# Patient Record
Sex: MALE | Race: WHITE
[De-identification: names, ages, dates, MRNs, and addresses within clinical notes are randomized per-mention and may not be internally consistent; named-entity substitution may affect disease eponyms.]

---

## 2017-03-19 ENCOUNTER — HOSPITAL ENCOUNTER (INPATIENT)
Dept: HOSPITAL 47 - EC | Age: 62
LOS: 2 days | Discharge: HOME | DRG: 191 | End: 2017-03-21
Payer: COMMERCIAL

## 2017-03-19 DIAGNOSIS — I25.10: ICD-10-CM

## 2017-03-19 DIAGNOSIS — E11.65: ICD-10-CM

## 2017-03-19 DIAGNOSIS — E78.00: ICD-10-CM

## 2017-03-19 DIAGNOSIS — Z79.899: ICD-10-CM

## 2017-03-19 DIAGNOSIS — F17.200: ICD-10-CM

## 2017-03-19 DIAGNOSIS — Z83.3: ICD-10-CM

## 2017-03-19 DIAGNOSIS — Z91.19: ICD-10-CM

## 2017-03-19 DIAGNOSIS — J20.9: ICD-10-CM

## 2017-03-19 DIAGNOSIS — Z79.4: ICD-10-CM

## 2017-03-19 DIAGNOSIS — E78.5: ICD-10-CM

## 2017-03-19 DIAGNOSIS — J44.0: Primary | ICD-10-CM

## 2017-03-19 DIAGNOSIS — E86.0: ICD-10-CM

## 2017-03-19 DIAGNOSIS — Z71.6: ICD-10-CM

## 2017-03-19 DIAGNOSIS — I48.2: ICD-10-CM

## 2017-03-19 DIAGNOSIS — Z79.82: ICD-10-CM

## 2017-03-19 DIAGNOSIS — N17.9: ICD-10-CM

## 2017-03-19 LAB
ALP SERPL-CCNC: 46 U/L (ref 38–126)
ALT SERPL-CCNC: 38 U/L (ref 21–72)
ANION GAP SERPL CALC-SCNC: 7 MMOL/L
APTT BLD: 22 SEC (ref 22–30)
AST SERPL-CCNC: 29 U/L (ref 17–59)
BASOPHILS # BLD AUTO: 0 K/UL (ref 0–0.2)
BASOPHILS NFR BLD AUTO: 1 %
BUN SERPL-SCNC: 31 MG/DL (ref 9–20)
CALCIUM SPEC-MCNC: 9 MG/DL (ref 8.4–10.2)
CH: 31.9
CHCM: 34.3
CHLORIDE SERPL-SCNC: 100 MMOL/L (ref 98–107)
CK SERPL-CCNC: 95 U/L (ref 55–170)
CO2 SERPL-SCNC: 29 MMOL/L (ref 22–30)
EOSINOPHIL # BLD AUTO: 0.1 K/UL (ref 0–0.7)
EOSINOPHIL NFR BLD AUTO: 1 %
ERYTHROCYTE [DISTWIDTH] IN BLOOD BY AUTOMATED COUNT: 4.32 M/UL (ref 4.3–5.9)
ERYTHROCYTE [DISTWIDTH] IN BLOOD: 13 % (ref 11.5–15.5)
GLUCOSE BLD-MCNC: 168 MG/DL (ref 75–99)
GLUCOSE SERPL-MCNC: 242 MG/DL (ref 74–99)
GLUCOSE UR QL: (no result)
HCT VFR BLD AUTO: 40.3 % (ref 39–53)
HDW: 2.44
HGB BLD-MCNC: 13.6 GM/DL (ref 13–17.5)
INR PPP: 1 (ref ?–1.1)
LUC NFR BLD AUTO: 4 %
LYMPHOCYTES # SPEC AUTO: 0.5 K/UL (ref 1–4.8)
LYMPHOCYTES NFR SPEC AUTO: 11 %
MAGNESIUM SPEC-SCNC: 1.8 MG/DL (ref 1.6–2.3)
MCH RBC QN AUTO: 31.4 PG (ref 25–35)
MCHC RBC AUTO-ENTMCNC: 33.7 G/DL (ref 31–37)
MCV RBC AUTO: 93.4 FL (ref 80–100)
MONOCYTES # BLD AUTO: 0.4 K/UL (ref 0–1)
MONOCYTES NFR BLD AUTO: 10 %
NEUTROPHILS # BLD AUTO: 3.2 K/UL (ref 1.3–7.7)
NEUTROPHILS NFR BLD AUTO: 73 %
NON-AFRICAN AMERICAN GFR(MDRD): 54
PARTICLE COUNT: 1680
PH UR: 5.5 [PH] (ref 5–8)
PHOSPHATE SERPL-MCNC: 3.8 MG/DL (ref 2.5–4.5)
POTASSIUM SERPL-SCNC: 4.6 MMOL/L (ref 3.5–5.1)
PROT SERPL-MCNC: 6.5 G/DL (ref 6.3–8.2)
PROT UR QL: (no result)
PT BLD: 10.4 SEC (ref 9–12)
SODIUM SERPL-SCNC: 136 MMOL/L (ref 137–145)
SP GR UR: 1.02 (ref 1–1.03)
SQUAMOUS UR QL AUTO: 1 /HPF (ref 0–4)
TROPONIN I SERPL-MCNC: <0.012 NG/ML (ref 0–0.03)
UA BILLING (MACRO VS. MICRO): (no result)
UROBILINOGEN UR QL STRIP: <2 MG/DL (ref ?–2)
WBC # BLD AUTO: 0.17 10*3/UL
WBC # BLD AUTO: 4.4 K/UL (ref 3.8–10.6)
WBC #/AREA URNS HPF: 2 /HPF (ref 0–5)
WBC (PEROX): 4.67

## 2017-03-19 PROCEDURE — 87205 SMEAR GRAM STAIN: CPT

## 2017-03-19 PROCEDURE — 85610 PROTHROMBIN TIME: CPT

## 2017-03-19 PROCEDURE — 82553 CREATINE MB FRACTION: CPT

## 2017-03-19 PROCEDURE — 83605 ASSAY OF LACTIC ACID: CPT

## 2017-03-19 PROCEDURE — 83036 HEMOGLOBIN GLYCOSYLATED A1C: CPT

## 2017-03-19 PROCEDURE — 94760 N-INVAS EAR/PLS OXIMETRY 1: CPT

## 2017-03-19 PROCEDURE — 87070 CULTURE OTHR SPECIMN AEROBIC: CPT

## 2017-03-19 PROCEDURE — 81001 URINALYSIS AUTO W/SCOPE: CPT

## 2017-03-19 PROCEDURE — 94640 AIRWAY INHALATION TREATMENT: CPT

## 2017-03-19 PROCEDURE — 96361 HYDRATE IV INFUSION ADD-ON: CPT

## 2017-03-19 PROCEDURE — 84484 ASSAY OF TROPONIN QUANT: CPT

## 2017-03-19 PROCEDURE — 96375 TX/PRO/DX INJ NEW DRUG ADDON: CPT

## 2017-03-19 PROCEDURE — 87086 URINE CULTURE/COLONY COUNT: CPT

## 2017-03-19 PROCEDURE — 93005 ELECTROCARDIOGRAM TRACING: CPT

## 2017-03-19 PROCEDURE — 71020: CPT

## 2017-03-19 PROCEDURE — 87502 INFLUENZA DNA AMP PROBE: CPT

## 2017-03-19 PROCEDURE — 87040 BLOOD CULTURE FOR BACTERIA: CPT

## 2017-03-19 PROCEDURE — 84100 ASSAY OF PHOSPHORUS: CPT

## 2017-03-19 PROCEDURE — 85025 COMPLETE CBC W/AUTO DIFF WBC: CPT

## 2017-03-19 PROCEDURE — 83735 ASSAY OF MAGNESIUM: CPT

## 2017-03-19 PROCEDURE — 82550 ASSAY OF CK (CPK): CPT

## 2017-03-19 PROCEDURE — 85730 THROMBOPLASTIN TIME PARTIAL: CPT

## 2017-03-19 PROCEDURE — 99285 EMERGENCY DEPT VISIT HI MDM: CPT

## 2017-03-19 PROCEDURE — 80053 COMPREHEN METABOLIC PANEL: CPT

## 2017-03-19 PROCEDURE — 96365 THER/PROPH/DIAG IV INF INIT: CPT

## 2017-03-19 PROCEDURE — 36415 COLL VENOUS BLD VENIPUNCTURE: CPT

## 2017-03-19 RX ADMIN — ACETAMINOPHEN SCH MLS/HR: 10 INJECTION, SOLUTION INTRAVENOUS at 23:47

## 2017-03-19 NOTE — XR
EXAMINATION TYPE: XR chest 2V

 

DATE OF EXAM: 3/19/2017 8:34 PM

 

COMPARISON: NONE

 

HISTORY: Weakness.

 

TECHNIQUE:  Frontal and lateral views of the chest are obtained.

 

FINDINGS:  There is no focal air space opacity, pleural effusion, or pneumothorax seen.  Underlying e
mphysematous change is not excluded on lateral view. The cardiac silhouette size is within normal rogers
its.   The osseous structures are intact.

 

IMPRESSION:  No acute cardiopulmonary process.

## 2017-03-19 NOTE — ED
General Adult HPI





- General


Chief complaint: Dizziness


Stated complaint: Dizzy


Time Seen by Provider: 03/19/17 19:33


Source: patient, family, RN notes reviewed, old records reviewed


Mode of arrival: wheelchair


Limitations: no limitations





- History of Present Illness


Initial comments: 





This is a 7-year-old male ER for evaluation of not feeling well.  Patient 

states that this time he is not feeling very good.  His whole body feels off.  

Patient has history of A. fib CAD diabetes and high cholesterol.  Patient is 

noted episodic chills, sweats, feels fatigued and weak.  Increased cough but no 

significant congestion or shortness of breath.  No bowel pain no nausea 

vomiting or diarrhea.  No known sick contacts or travel history.  No recent 

hospitalization





- Related Data


 Home Medications











 Medication  Instructions  Recorded  Confirmed


 


Aspirin 325 mg PO QAM 02/09/16 03/19/17


 


Fenofibrate Nanocrystallized 145 mg PO QAM 02/09/16 03/19/17





[Tricor]   


 


Insulin NPH Hum/Reg Insulin Hm 55 unit SQ AC-BID 02/09/16 03/19/17





[NovoLIN 70-30 100 UNIT/ML VIAL]   


 


Simvastatin [Zocor] 80 mg PO HS 02/09/16 03/19/17


 


Digoxin [Digitek] 125 mcg PO DAILY 03/19/17 03/19/17


 


Ergocalciferol [Vitamin D2] 50,000 unit PO SA 03/19/17 03/19/17











 Allergies











Allergy/AdvReac Type Severity Reaction Status Date / Time


 


No Known Allergies Allergy   Verified 03/19/17 20:21














Review of Systems


ROS Statement: 


Those systems with pertinent positive or pertinent negative responses have been 

documented in the HPI.





ROS Other: All systems not noted in ROS Statement are negative.





Past Medical History


Past Medical History: Atrial Fibrillation, Coronary Artery Disease (CAD), 

Diabetes Mellitus, Hyperlipidemia


Additional Past Medical History / Comment(s): " HEART PALPITATIONS SEVERAL 

YEARS AGO"


History of Any Multi-Drug Resistant Organisms: None Reported


Past Surgical History: Hernia Repair, Tonsillectomy


Past Anesthesia/Blood Transfusion Reactions: No Reported Reaction


Past Psychological History: No Psychological Hx Reported


Smoking Status: Current every day smoker


Past Alcohol Use History: None Reported


Additional Past Alcohol Use History / Comment(s): 1/2 PPD since 1972 44 years


Past Drug Use History: None Reported





- Past Family History


  ** Mother


Family Medical History: Diabetes Mellitus





  ** Father


Additional Family Medical History / Comment(s): ulcers, "lung aneurysm passed 

from"





General Exam


Limitations: no limitations


General appearance: alert, in no apparent distress


Head exam: Present: atraumatic, normocephalic, normal inspection


Eye exam: Present: normal appearance, PERRL, EOMI.  Absent: scleral icterus, 

conjunctival injection, periorbital swelling


ENT exam: Present: normal exam, mucous membranes moist


Neck exam: Present: normal inspection.  Absent: tenderness, meningismus, 

lymphadenopathy


Respiratory exam: Present: normal lung sounds bilaterally, rhonchi, decreased 

breath sounds.  Absent: respiratory distress, wheezes, rales, stridor


Cardiovascular Exam: Present: regular rate, normal rhythm, normal heart sounds.

  Absent: systolic murmur, diastolic murmur, rubs, gallop, clicks


GI/Abdominal exam: Present: soft, normal bowel sounds.  Absent: distended, 

tenderness, guarding, rebound, rigid


Extremities exam: Present: normal inspection, full ROM, normal capillary 

refill.  Absent: tenderness, pedal edema, joint swelling, calf tenderness


Back exam: Present: normal inspection


Neurological exam: Present: alert, oriented X3, CN II-XII intact


Psychiatric exam: Present: normal affect, normal mood


Skin exam: Present: warm, dry, intact, normal color.  Absent: rash





Course


 Vital Signs











  03/19/17 03/19/17





  19:21 20:58


 


Temperature 100.9 F H 100.3 F H


 


Pulse Rate 86 72


 


Respiratory 18 14





Rate  


 


Blood Pressure 116/63 97/55


 


O2 Sat by Pulse 94 L 92 L





Oximetry  














- Reevaluation(s)


Reevaluation #1: 





03/19/17 21:16


Ages 50s had a cough going on as well as his fever, significant cough or 

shortness of breath


Reevaluation #2: 





03/19/17 21:16


Patient still feeling weak and fatigued even with fever control


Reevaluation #3: 





03/19/17 21:16


Patient given a breathing treatment which has helped with cough and shortness 

of breath





EKG Findings





- EKG Comments:


EKG Findings:: EKG shows A. fib rate of 75, , 





Medical Decision Making





- Medical Decision Making





17-year-old ER for reevaluation of fever and weakness, positive pneumonia 

clinically, patient will be admitted for IV fluid resuscitation and IV 

hydration and fever control breathing treatments





- Lab Data


Result diagrams: 


 03/19/17 20:08





 03/19/17 20:08


 Lab Results











  03/19/17 03/19/17 03/19/17 Range/Units





  20:08 20:08 20:08 


 


WBC   4.4   (3.8-10.6)  k/uL


 


RBC   4.32   (4.30-5.90)  m/uL


 


Hgb   13.6   (13.0-17.5)  gm/dL


 


Hct   40.3   (39.0-53.0)  %


 


MCV   93.4   (80.0-100.0)  fL


 


MCH   31.4   (25.0-35.0)  pg


 


MCHC   33.7   (31.0-37.0)  g/dL


 


RDW   13.0   (11.5-15.5)  %


 


Plt Count   137 L   (150-450)  k/uL


 


Neutrophils %   73   %


 


Lymphocytes %   11   %


 


Monocytes %   10   %


 


Eosinophils %   1   %


 


Basophils %   1   %


 


Neutrophils #   3.2   (1.3-7.7)  k/uL


 


Lymphocytes #   0.5 L   (1.0-4.8)  k/uL


 


Monocytes #   0.4   (0-1.0)  k/uL


 


Eosinophils #   0.1   (0-0.7)  k/uL


 


Basophils #   0.0   (0-0.2)  k/uL


 


PT     (9.0-12.0)  sec


 


INR     (<1.1)  


 


APTT     (22.0-30.0)  sec


 


Sodium    136 L  (137-145)  mmol/L


 


Potassium    4.6  (3.5-5.1)  mmol/L


 


Chloride    100  ()  mmol/L


 


Carbon Dioxide    29  (22-30)  mmol/L


 


Anion Gap    7  mmol/L


 


BUN    31 H  (9-20)  mg/dL


 


Creatinine    1.35 H  (0.66-1.25)  mg/dL


 


Est GFR (MDRD) Af Amer    >60  (>60 ml/min/1.73 sqM)  


 


Est GFR (MDRD) Non-Af    54  (>60 ml/min/1.73 sqM)  


 


Glucose    242 H  (74-99)  mg/dL


 


Plasma Lactic Acid Antonio     (0.7-2.0)  mmol/L


 


Calcium    9.0  (8.4-10.2)  mg/dL


 


Phosphorus    3.8  (2.5-4.5)  mg/dL


 


Magnesium    1.8  (1.6-2.3)  mg/dL


 


Total Bilirubin    0.4  (0.2-1.3)  mg/dL


 


AST    29  (17-59)  U/L


 


ALT    38  (21-72)  U/L


 


Alkaline Phosphatase    46  ()  U/L


 


Total Creatine Kinase  95    ()  U/L


 


CK-MB (CK-2)  0.4    (0.0-2.4)  ng/mL


 


CK-MB (CK-2) Rel Index  0.4    


 


Troponin I  <0.012    (0.000-0.034)  ng/mL


 


Total Protein    6.5  (6.3-8.2)  g/dL


 


Albumin    3.6  (3.5-5.0)  g/dL


 


Urine Color     


 


Urine Appearance     (Clear)  


 


Urine pH     (5.0-8.0)  


 


Ur Specific Gravity     (1.001-1.035)  


 


Urine Protein     (Negative)  


 


Urine Glucose (UA)     (Negative)  


 


Urine Ketones     (Negative)  


 


Urine Blood     (Negative)  


 


Urine Nitrite     (Negative)  


 


Urine Bilirubin     (Negative)  


 


Urine Urobilinogen     (<2.0)  mg/dL


 


Ur Leukocyte Esterase     (Negative)  


 


Urine WBC     (0-5)  /hpf


 


Ur Squamous Epith Cells     (0-4)  /hpf


 


Amorphous Sediment     (None)  /hpf


 


Hyaline Casts     (0-2)  /lpf


 


Urine Mucus     (None)  /hpf


 


Influenza Type A RNA     (Not Detectd)  


 


Influenza Type B (PCR)     (Not Detectd)  














  03/19/17 03/19/17 03/19/17 Range/Units





  20:08 20:08 20:08 


 


WBC     (3.8-10.6)  k/uL


 


RBC     (4.30-5.90)  m/uL


 


Hgb     (13.0-17.5)  gm/dL


 


Hct     (39.0-53.0)  %


 


MCV     (80.0-100.0)  fL


 


MCH     (25.0-35.0)  pg


 


MCHC     (31.0-37.0)  g/dL


 


RDW     (11.5-15.5)  %


 


Plt Count     (150-450)  k/uL


 


Neutrophils %     %


 


Lymphocytes %     %


 


Monocytes %     %


 


Eosinophils %     %


 


Basophils %     %


 


Neutrophils #     (1.3-7.7)  k/uL


 


Lymphocytes #     (1.0-4.8)  k/uL


 


Monocytes #     (0-1.0)  k/uL


 


Eosinophils #     (0-0.7)  k/uL


 


Basophils #     (0-0.2)  k/uL


 


PT   10.4   (9.0-12.0)  sec


 


INR   1.0   (<1.1)  


 


APTT   22.0   (22.0-30.0)  sec


 


Sodium     (137-145)  mmol/L


 


Potassium     (3.5-5.1)  mmol/L


 


Chloride     ()  mmol/L


 


Carbon Dioxide     (22-30)  mmol/L


 


Anion Gap     mmol/L


 


BUN     (9-20)  mg/dL


 


Creatinine     (0.66-1.25)  mg/dL


 


Est GFR (MDRD) Af Amer     (>60 ml/min/1.73 sqM)  


 


Est GFR (MDRD) Non-Af     (>60 ml/min/1.73 sqM)  


 


Glucose     (74-99)  mg/dL


 


Plasma Lactic Acid Antonio  1.4    (0.7-2.0)  mmol/L


 


Calcium     (8.4-10.2)  mg/dL


 


Phosphorus     (2.5-4.5)  mg/dL


 


Magnesium     (1.6-2.3)  mg/dL


 


Total Bilirubin     (0.2-1.3)  mg/dL


 


AST     (17-59)  U/L


 


ALT     (21-72)  U/L


 


Alkaline Phosphatase     ()  U/L


 


Total Creatine Kinase     ()  U/L


 


CK-MB (CK-2)     (0.0-2.4)  ng/mL


 


CK-MB (CK-2) Rel Index     


 


Troponin I     (0.000-0.034)  ng/mL


 


Total Protein     (6.3-8.2)  g/dL


 


Albumin     (3.5-5.0)  g/dL


 


Urine Color     


 


Urine Appearance     (Clear)  


 


Urine pH     (5.0-8.0)  


 


Ur Specific Gravity     (1.001-1.035)  


 


Urine Protein     (Negative)  


 


Urine Glucose (UA)     (Negative)  


 


Urine Ketones     (Negative)  


 


Urine Blood     (Negative)  


 


Urine Nitrite     (Negative)  


 


Urine Bilirubin     (Negative)  


 


Urine Urobilinogen     (<2.0)  mg/dL


 


Ur Leukocyte Esterase     (Negative)  


 


Urine WBC     (0-5)  /hpf


 


Ur Squamous Epith Cells     (0-4)  /hpf


 


Amorphous Sediment     (None)  /hpf


 


Hyaline Casts     (0-2)  /lpf


 


Urine Mucus     (None)  /hpf


 


Influenza Type A RNA    Not Detected  (Not Detectd)  


 


Influenza Type B (PCR)    Not Detected  (Not Detectd)  














  03/19/17 Range/Units





  20:20 


 


WBC   (3.8-10.6)  k/uL


 


RBC   (4.30-5.90)  m/uL


 


Hgb   (13.0-17.5)  gm/dL


 


Hct   (39.0-53.0)  %


 


MCV   (80.0-100.0)  fL


 


MCH   (25.0-35.0)  pg


 


MCHC   (31.0-37.0)  g/dL


 


RDW   (11.5-15.5)  %


 


Plt Count   (150-450)  k/uL


 


Neutrophils %   %


 


Lymphocytes %   %


 


Monocytes %   %


 


Eosinophils %   %


 


Basophils %   %


 


Neutrophils #   (1.3-7.7)  k/uL


 


Lymphocytes #   (1.0-4.8)  k/uL


 


Monocytes #   (0-1.0)  k/uL


 


Eosinophils #   (0-0.7)  k/uL


 


Basophils #   (0-0.2)  k/uL


 


PT   (9.0-12.0)  sec


 


INR   (<1.1)  


 


APTT   (22.0-30.0)  sec


 


Sodium   (137-145)  mmol/L


 


Potassium   (3.5-5.1)  mmol/L


 


Chloride   ()  mmol/L


 


Carbon Dioxide   (22-30)  mmol/L


 


Anion Gap   mmol/L


 


BUN   (9-20)  mg/dL


 


Creatinine   (0.66-1.25)  mg/dL


 


Est GFR (MDRD) Af Amer   (>60 ml/min/1.73 sqM)  


 


Est GFR (MDRD) Non-Af   (>60 ml/min/1.73 sqM)  


 


Glucose   (74-99)  mg/dL


 


Plasma Lactic Acid Antonio   (0.7-2.0)  mmol/L


 


Calcium   (8.4-10.2)  mg/dL


 


Phosphorus   (2.5-4.5)  mg/dL


 


Magnesium   (1.6-2.3)  mg/dL


 


Total Bilirubin   (0.2-1.3)  mg/dL


 


AST   (17-59)  U/L


 


ALT   (21-72)  U/L


 


Alkaline Phosphatase   ()  U/L


 


Total Creatine Kinase   ()  U/L


 


CK-MB (CK-2)   (0.0-2.4)  ng/mL


 


CK-MB (CK-2) Rel Index   


 


Troponin I   (0.000-0.034)  ng/mL


 


Total Protein   (6.3-8.2)  g/dL


 


Albumin   (3.5-5.0)  g/dL


 


Urine Color  Yellow  


 


Urine Appearance  Clear  (Clear)  


 


Urine pH  5.5  (5.0-8.0)  


 


Ur Specific Gravity  1.019  (1.001-1.035)  


 


Urine Protein  1+ H  (Negative)  


 


Urine Glucose (UA)  4+ H  (Negative)  


 


Urine Ketones  Negative  (Negative)  


 


Urine Blood  Negative  (Negative)  


 


Urine Nitrite  Negative  (Negative)  


 


Urine Bilirubin  Negative  (Negative)  


 


Urine Urobilinogen  <2.0  (<2.0)  mg/dL


 


Ur Leukocyte Esterase  Negative  (Negative)  


 


Urine WBC  2  (0-5)  /hpf


 


Ur Squamous Epith Cells  1  (0-4)  /hpf


 


Amorphous Sediment  Rare H  (None)  /hpf


 


Hyaline Casts  1  (0-2)  /lpf


 


Urine Mucus  Rare H  (None)  /hpf


 


Influenza Type A RNA   (Not Detectd)  


 


Influenza Type B (PCR)   (Not Detectd)  














- Radiology Data


Radiology results: report reviewed (Chest x-ray suspicious for pneumonia), 

image reviewed





Disposition


Clinical Impression: 


 Dehydration, Fever, Community acquired pneumonia, ARF (acute renal failure)





Disposition: ADMITTED AS IP TO THIS HOSP


Condition: Fair


Referrals: 


Kj Lawrence MD [Primary Care Provider] - 1-2 days

## 2017-03-20 LAB
GLUCOSE BLD-MCNC: 128 MG/DL (ref 75–99)
GLUCOSE BLD-MCNC: 164 MG/DL (ref 75–99)
GLUCOSE BLD-MCNC: 195 MG/DL (ref 75–99)
GLUCOSE BLD-MCNC: 358 MG/DL (ref 75–99)

## 2017-03-20 RX ADMIN — APIXABAN SCH MG: 5 TABLET, FILM COATED ORAL at 20:51

## 2017-03-20 RX ADMIN — NICOTINE SCH PATCH: 14 PATCH, EXTENDED RELEASE TRANSDERMAL at 18:01

## 2017-03-20 RX ADMIN — FENOFIBRATE SCH MG: 160 TABLET ORAL at 10:18

## 2017-03-20 RX ADMIN — ACETAMINOPHEN SCH: 10 INJECTION, SOLUTION INTRAVENOUS at 17:48

## 2017-03-20 RX ADMIN — IPRATROPIUM BROMIDE AND ALBUTEROL SULFATE SCH ML: .5; 3 SOLUTION RESPIRATORY (INHALATION) at 20:46

## 2017-03-20 RX ADMIN — DIGOXIN SCH MCG: 125 TABLET ORAL at 10:17

## 2017-03-20 RX ADMIN — ACETAMINOPHEN SCH: 10 INJECTION, SOLUTION INTRAVENOUS at 10:10

## 2017-03-20 RX ADMIN — IPRATROPIUM BROMIDE AND ALBUTEROL SULFATE SCH ML: .5; 3 SOLUTION RESPIRATORY (INHALATION) at 13:06

## 2017-03-20 RX ADMIN — ACETAMINOPHEN SCH MLS/HR: 10 INJECTION, SOLUTION INTRAVENOUS at 05:53

## 2017-03-20 RX ADMIN — IPRATROPIUM BROMIDE AND ALBUTEROL SULFATE SCH ML: .5; 3 SOLUTION RESPIRATORY (INHALATION) at 16:56

## 2017-03-20 RX ADMIN — ASPIRIN 325 MG ORAL TABLET SCH MG: 325 PILL ORAL at 10:17

## 2017-03-20 RX ADMIN — INSULIN HUMAN SCH UNIT: 100 INJECTION, SUSPENSION SUBCUTANEOUS at 18:01

## 2017-03-20 RX ADMIN — IPRATROPIUM BROMIDE AND ALBUTEROL SULFATE SCH ML: .5; 3 SOLUTION RESPIRATORY (INHALATION) at 08:44

## 2017-03-20 NOTE — HP
DATE OF ADMISSION:  03/19/2017



CHIEF COMPLAINT: Fever cough and malaise.



HISTORY OF PRESENT ILLNESS: This is another admission for this 

noncompliant 61-year-old white male. He has a long-standing history of 

poorly controlled insulin-dependent diabetes mellitus and refuses to 

take appropriate care of himself.  He continues to smoke. He always 

had a high hemoglobin A1C. He started to develop cough, congestion, 

fever and came to the emergency room where he was identified as having 

pneumonia.  



REVIEW OF SYSTEMS: He has had no chest pain, hemoptysis, nausea, 

vomiting, diarrhea, etc.  



The past medical history, family history and personal and social 

history reveal that he is not allergic to any medication.  



He is on:

1. Novolog 70/30 70 units in the morning and 50 at night.

2. Fenofibrate 145 mg once a day.

3. Lanoxin 0.125 mg once a day.

4. Zocor once a day. 



He is an atrial fibrillation, but states she cannot take 

anticoagulants and has been tried on Coumadin and  

(     ).   Remainder of his history is unremarkable except that he 

continues to smoke.  



PHYSICAL EXAMINATION: Temperature is 100, respirations 36. Pulse 100 

and blood pressure 128/70.  



GENERAL: Appeared to be well-developed, well-nourished and in no acute 

distress.  

SKIN: Skin color is normal. Skin was hot and dry. Lymph nodes were not 

enlarged. Head, ears, eyes, nose, mouth, and throat are normal. Neck 

veins not distended. Thyroid is not enlarged.  

CHEST: Chest demonstrated poor breath sounds throughout with 

occasional wheezing and rales.  

CARDIAC: Demonstrated sinus tachycardia with no murmurs or extra 

sounds.  

ABDOMEN: Soft, nontender without visceromegaly or masses. 

EXTREMITIES: Normal. Neurologically intact. 



IMPRESSION:

1. Bronchopneumonia. 

2. Uncontrolled diabetes.

3. Chronic obstructive pulmonary disease. 



PLAN:

1. Bed rest. 

2. IV fluids.

3. IV inhaled steroids. 

4. Antibiotics. 

5. Control blood sugars.

## 2017-03-20 NOTE — PN
DATE OF SERVICE: 03/20/2017



CHIEF COMPLAINT: Pneumonitis. 



HISTORY OF PRESENT ILLNESS: The gentleman is not feeling much better. 

The patient is still very short of breath.  



PHYSICAL EXAM: Chest demonstrates wheezes and rales and rhonchi 

throughout.  

CARDIAC: Tachycardia demonstrates tachycardia.  The abdomen is soft 

and nontender.  



IMPRESSION:

1. Pneumonitis. 

2. Chronic obstructive pulmonary disease. 

3. Poorly controlled insulin-dependent diabetes mellitus. 



PLAN: Continue updrafts, antibiotics and management of his diabetes.

## 2017-03-20 NOTE — P.CRDCN
History of Present Illness


Consult date: 03/20/17


Chief complaint: Feeding tired and fatigued


History of present illness: 





This is a pleasant 61-year-old gentleman who I saw in the office about a year 

ago with a past medical history significant for chronic atrial fibrillation 

resented to the hospital because he was not feeling well.


The patient denies having any anginal chest discomfort or any shortness of 

breath but he has been experiencing cough with sputum production.  Lately also 

he was feeling tired and fatigued.


The chest x-ray showed no acute abnormalities.


The EKG showed chronic A. fib with controlled heart rate.


He underwent one set of cardiac enzymes came in to be unremarkable.


The patient is not on any anticoagulation because he lost his insurance in the 

past and he could not afford taken it.  Now he has insurance.





From the cardiovascular standpoint overview, the patient is hemodynamically 

stable.  Also he is asymptomatic.  I am going to continue the digoxin.  I will 

start the patient back on anticoagulation with Eliquis.





Past Medical History


Past Medical History: Atrial Fibrillation, Coronary Artery Disease (CAD), 

Diabetes Mellitus, Hyperlipidemia


Additional Past Medical History / Comment(s): " HEART PALPITATIONS SEVERAL 

YEARS AGO"


History of Any Multi-Drug Resistant Organisms: None Reported


Past Surgical History: Hernia Repair, Tonsillectomy


Past Anesthesia/Blood Transfusion Reactions: No Reported Reaction


Past Psychological History: No Psychological Hx Reported


Smoking Status: Current every day smoker


Past Alcohol Use History: None Reported


Additional Past Alcohol Use History / Comment(s): 1/2 PPD since 1972 44 years


Past Drug Use History: None Reported





- Past Family History


  ** Mother


Family Medical History: Diabetes Mellitus





  ** Father


Additional Family Medical History / Comment(s): ulcers, "lung aneurysm passed 

from"





Medications and Allergies


 Home Medications











 Medication  Instructions  Recorded  Confirmed  Type


 


Aspirin 325 mg PO QAM 02/09/16 03/19/17 History


 


Fenofibrate Nanocrystallized 145 mg PO QAM 02/09/16 03/19/17 History





[Tricor]    


 


Insulin NPH Hum/Reg Insulin Hm 55 unit SQ AC-BID 02/09/16 03/19/17 History





[NovoLIN 70-30 100 UNIT/ML VIAL]    


 


Simvastatin [Zocor] 80 mg PO HS 02/09/16 03/19/17 History


 


Digoxin [Digitek] 125 mcg PO DAILY 03/19/17 03/19/17 History


 


Ergocalciferol [Vitamin D2] 50,000 unit PO SA 03/19/17 03/19/17 History











 Allergies











Allergy/AdvReac Type Severity Reaction Status Date / Time


 


No Known Allergies Allergy   Verified 03/19/17 20:21














Physical Exam


Vitals: 


 Vital Signs











  Temp Pulse Pulse Resp BP BP Pulse Ox


 


 03/20/17 11:12    74  20   


 


 03/20/17 08:54   70     


 


 03/20/17 08:44   70      92 L


 


 03/20/17 07:00  96.3 F L   74  20   102/49  94 L


 


 03/19/17 22:25  98.2 F   83  20   90/54  95


 


 03/19/17 21:56  99.9 F H  74   22  100/60   95


 


 03/19/17 21:36   70     


 


 03/19/17 21:32   71     








 Intake and Output











 03/19/17 03/20/17 03/20/17





 22:59 06:59 14:59


 


Intake Total  100 


 


Balance  100 


 


Intake:   


 


  Oral  100 


 


Other:   


 


  Voiding Method  Toilet Toilet





  Urinal Urinal


 


  # Voids  1 














- Constitutional


General appearance: no acute distress





- Respiratory


Respiratory: bilateral: rhonchi





- Cardiovascular


Rhythm: irregularly irregular


Heart sounds: normal: S1, S2





Results





 03/19/17 20:08





 03/19/17 20:08


 Current Medications











Generic Name Dose Route Start Last Admin





  Trade Name Freq  PRN Reason Stop Dose Admin


 


Albuterol/Ipratropium  3 ml  03/20/17 08:00  03/20/17 08:44





  Duoneb 0.5 Mg-3 Mg/3 Ml Soln  INHALATION   3 ml





  RT-QID KHURRAM   Administration


 


Apixaban  5 mg  03/20/17 21:00  





  Eliquis  PO   





  BID KHURRAM   


 


Aspirin  325 mg  03/20/17 09:00  03/20/17 10:17





  Aspirin  PO   325 mg





  QAM KHURRAM   Administration


 


Atorvastatin Calcium  40 mg  03/20/17 21:00  





  Lipitor  PO   





  [facility]   


 


Digoxin  125 mcg  03/20/17 09:00  03/20/17 10:17





  Lanoxin  PO   125 mcg





  DAILY KHURRAM   Administration


 


Enoxaparin Sodium  40 mg  03/20/17 09:00  03/20/17 10:16





  Lovenox  SQ   40 mg





  DAILY KHURRAM   Administration


 


Ergocalciferol  50,000 unit  03/25/17 12:00  





  Vitamin D2  PO   





  SA [facility]   


 


Fenofibrate  160 mg  03/20/17 09:00  03/20/17 10:18





  Lofibra  PO   160 mg





  QAM KHURRAM   Administration


 


Acetaminophen 1,000 mg/ IV  100 mls @ 400 mls/hr  03/20/17 00:00  03/20/17 10:10





  Solution  IVPB  03/20/17 18:14  Not Given





  Q6HR KHURRAM   


 


Levofloxacin 750 mg/ IV  150 mls @ 100 mls/hr  03/20/17 21:13  





  Solution  IVPB  03/30/17 21:14  





  Q24H KHURRAM   


 


Ibuprofen  600 mg  03/19/17 21:12  





  Motrin  PO   





  QID PRN   





  Fever   


 


Insulin Human Isoph/Insulin Regular  55 unit  03/20/17 17:30  





  Humulin 70/30 Vial  SQ   





  AC-BID KHURRAM   


 


Miscellaneous Information  1 each  03/19/17 21:12  





  Pneumonia Protocol Utilized  PO   





  ONCE PRN   





  Per Protocol   








 Intake and Output











 03/19/17 03/20/17 03/20/17





 22:59 06:59 14:59


 


Intake Total  100 


 


Balance  100 


 


Intake:   


 


  Oral  100 


 


Other:   


 


  Voiding Method  Toilet Toilet





  Urinal Urinal


 


  # Voids  1 














Assessment and Plan


Plan: 





Assessment


#1 chronic A. fib with controlled heart rate


#2 possible pneumonia





Plan


#1 the patient currently is on antibiotic


#2 continue the digoxin


#3 restart him on anticoagulation

## 2017-03-20 NOTE — XR
EXAMINATION TYPE: XR chest 2V

 

DATE OF EXAM: 3/20/2017 7:05 AM

 

COMPARISON: Chest x-ray from yesterday.

 

HISTORY: Pneumonia and shortness of breath

 

TECHNIQUE:  Frontal and lateral views of the chest are obtained.

 

FINDINGS:  There is no focal air space opacity, pleural effusion, or pneumothorax seen. Underlying em
physematous change is not excluded on lateral view with flattening of hemidiaphragms The cardiac silh
ouette size is stable and upper limits of normal.   The osseous structures are intact.

 

IMPRESSION:  No suspicious acute pulmonary process. No significant change from prior.

## 2017-03-21 VITALS — SYSTOLIC BLOOD PRESSURE: 117 MMHG | DIASTOLIC BLOOD PRESSURE: 66 MMHG | TEMPERATURE: 97.3 F | HEART RATE: 88 BPM

## 2017-03-21 VITALS — RESPIRATION RATE: 20 BRPM

## 2017-03-21 LAB
GLUCOSE BLD-MCNC: 99 MG/DL (ref 75–99)
HEMOGLOBIN A1C: 10.9 % (ref 4.2–6.1)

## 2017-03-21 RX ADMIN — FENOFIBRATE SCH MG: 160 TABLET ORAL at 08:34

## 2017-03-21 RX ADMIN — NICOTINE SCH PATCH: 14 PATCH, EXTENDED RELEASE TRANSDERMAL at 08:34

## 2017-03-21 RX ADMIN — IPRATROPIUM BROMIDE AND ALBUTEROL SULFATE SCH ML: .5; 3 SOLUTION RESPIRATORY (INHALATION) at 07:09

## 2017-03-21 RX ADMIN — IPRATROPIUM BROMIDE AND ALBUTEROL SULFATE SCH: .5; 3 SOLUTION RESPIRATORY (INHALATION) at 11:13

## 2017-03-21 RX ADMIN — DIGOXIN SCH MCG: 125 TABLET ORAL at 08:35

## 2017-03-21 RX ADMIN — APIXABAN SCH MG: 5 TABLET, FILM COATED ORAL at 08:35

## 2017-03-21 RX ADMIN — INSULIN HUMAN SCH UNIT: 100 INJECTION, SUSPENSION SUBCUTANEOUS at 08:34

## 2017-03-21 RX ADMIN — ASPIRIN 325 MG ORAL TABLET SCH MG: 325 PILL ORAL at 08:34

## 2017-03-21 NOTE — P.DS
Providers


Date of admission: 


03/19/17 21:12





Expected date of discharge: 03/21/17


Attending physician: 


Kj Rizzo





Consults: 





Dr. Gray cardiology


Primary care physician: 


Kj Rizzo





Hospital Course: 





This is a 61-year-old male who presented on the day of admission to the 

emergency room with a chief complaint "just not feeling well.  Patient stated 

he was having episodes of feeling chilled fever did not take his temp and 

subjectively felt feverish.  Felt like he was coughing but was not able to 

cough up anything.  Patient stated there was no nausea vomiting no diarrhea.  

There is no known sick contacts level history.  No recent hospitalization.  In 

the emergency room the temp was 100.9.  Heart rate in the 80s on room air sats 

were 94%.  Patient was given a breathing treatment in the emergency room and 

there was a noted improvement.  Patient's creatinine was mildly elevated at 

1.3.  Patient was admitted and treated for clinical dehydration chest x-ray 

suggests possible pneumonia clinically with dehydration.  Patient was started 

on IV fluid resuscitation and IV hydration with updraft treatments a repeat 

chest x-ray on March 20 showed no suspicious acute pulmonary process.  There 

was no focal airspace opacity 





A cardiology consultation was obtained.  Patient does have a history 

significant for chronic atrial fibrillation.  Patient stated that he was put on 

Coumadin in the outpatient setting but stopped it because of ill side effects.  

Patient stated when he took Coumadin he did not feel well was having nightmares 

visual hallucinations stated he could not tolerate the Coumadin so he stopped 

it.  Additionally patient stated he was started on elquist but his insurance 

would not pay for it so he has been out of anticoagulation because he could not 

afford the  elquist and he could not tolerate the Coumadin.  This admission 

patient was restarted elquist did agree  continue taking.  Subsequently the 

patient was felt to be medically stable appropriate to proceed with a discharge 

to home.











Impression discharge diagnosis


Chronic atrial fibrillation controlled ventricular response


Current every day smoker greater than a 40 year history


COPD no evidence of acute exacerbation


Present on admission shortness of breath febrile suspect due to 

tracheobronchitis 


Chest x-ray no evidence of pneumonia


(Type 2 diabetes insulin requiring








The above dictated assessment and findings were discussed with dr rizzo


.  Impression and the plan of care have been dictated as directed.  Jewels Louise 

nurse practitioner acting as a scribe for dr rizzo


Patient Condition at Discharge: Fair





Plan - Discharge Summary


New Discharge Prescriptions: 


Apixaban [Eliquis] 5 mg PO BID #60 tab


Levofloxacin [Levaquin] 500 mg PO Q24H #5 tab


Nicotine 14Mg/24Hr Patch [Habitrol] 1 patch TRANSDERM DAILY #30 patch


Discharge Medication List





Aspirin 325 mg PO QAM 02/09/16 [History]


Fenofibrate Nanocrystallized [Tricor] 145 mg PO QAM 02/09/16 [History]


Insulin NPH Hum/Reg Insulin Hm [NovoLIN 70-30 100 UNIT/ML VIAL] 55 unit SQ AC-

BID 02/09/16 [History]


Simvastatin [Zocor] 80 mg PO HS 02/09/16 [History]


Digoxin [Digitek] 125 mcg PO DAILY 03/19/17 [History]


Ergocalciferol [Vitamin D2 (DRISDOL)] 50,000 unit PO SA 03/19/17 [History]


Apixaban [Eliquis] 5 mg PO BID #60 tab 03/21/17 [Rx]


Levofloxacin [Levaquin] 500 mg PO Q24H #5 tab 03/21/17 [Rx]


Nicotine 14Mg/24Hr Patch [Habitrol] 1 patch TRANSDERM DAILY #30 patch 03/21/17 [

Rx]








Follow up Appointment(s)/Referral(s): 


Kj Rizzo MD [Primary Care Provider] - 1-2 days


Activity/Diet/Wound Care/Special Instructions: 


Smoking cessation information to be provided patient's been advised to stop 

smoking cigarettes


Additionally Dr. Rizzo did inform the patient that if his insurance did not 

pay for elquist that he possibly could provide the patient with samples he was 

to stop at the office today to  elquist  samples if needed


Discharge Disposition: HOME SELF-CARE

## 2017-03-21 NOTE — PN
DATE OF SERVICE:  03/21/2017



CHIEF COMPLAINT:  Pneumonia. 



HISTORY OF PRESENT ILLNESS: The gentleman is doing a little bit 

better. He is complaining of some mild discomfort in the ears. He is 

afebrile and his is not coughing or short of breath.  



PHYSICAL EXAM: 

He is in atrial fibrillation. His chest is clear, there are no rales 

or rhonchi.  The abdomen is soft, nontender.  



IMPRESSION: 

1. Pneumonitis. 

2. Atrial fibrillation. 

3. Diabetes. 



PLAN: Probably home today and this will be arranged by the nurse 

practitioner.

## 2017-05-23 ENCOUNTER — HOSPITAL ENCOUNTER (OUTPATIENT)
Dept: HOSPITAL 47 - EC | Age: 62
Setting detail: OBSERVATION
LOS: 2 days | Discharge: HOME | End: 2017-05-25
Payer: COMMERCIAL

## 2017-05-23 VITALS — BODY MASS INDEX: 32.5 KG/M2

## 2017-05-23 DIAGNOSIS — Z91.19: ICD-10-CM

## 2017-05-23 DIAGNOSIS — R61: ICD-10-CM

## 2017-05-23 DIAGNOSIS — I48.1: ICD-10-CM

## 2017-05-23 DIAGNOSIS — Z79.899: ICD-10-CM

## 2017-05-23 DIAGNOSIS — I25.10: ICD-10-CM

## 2017-05-23 DIAGNOSIS — M54.6: ICD-10-CM

## 2017-05-23 DIAGNOSIS — F17.210: ICD-10-CM

## 2017-05-23 DIAGNOSIS — R42: ICD-10-CM

## 2017-05-23 DIAGNOSIS — E11.65: ICD-10-CM

## 2017-05-23 DIAGNOSIS — N18.9: ICD-10-CM

## 2017-05-23 DIAGNOSIS — Z79.4: ICD-10-CM

## 2017-05-23 DIAGNOSIS — E11.649: ICD-10-CM

## 2017-05-23 DIAGNOSIS — Z83.3: ICD-10-CM

## 2017-05-23 DIAGNOSIS — E11.22: ICD-10-CM

## 2017-05-23 DIAGNOSIS — M79.89: ICD-10-CM

## 2017-05-23 DIAGNOSIS — I80.8: ICD-10-CM

## 2017-05-23 DIAGNOSIS — E78.5: ICD-10-CM

## 2017-05-23 DIAGNOSIS — Z79.01: ICD-10-CM

## 2017-05-23 DIAGNOSIS — J44.9: ICD-10-CM

## 2017-05-23 DIAGNOSIS — I95.9: Primary | ICD-10-CM

## 2017-05-23 LAB
ALP SERPL-CCNC: 50 U/L (ref 38–126)
ALT SERPL-CCNC: 37 U/L (ref 21–72)
ANION GAP SERPL CALC-SCNC: 11 MMOL/L
AST SERPL-CCNC: 26 U/L (ref 17–59)
BASOPHILS # BLD AUTO: 0 K/UL (ref 0–0.2)
BASOPHILS NFR BLD AUTO: 1 %
BUN SERPL-SCNC: 27 MG/DL (ref 9–20)
CALCIUM SPEC-MCNC: 10.1 MG/DL (ref 8.4–10.2)
CH: 32.4
CHCM: 34
CHLORIDE SERPL-SCNC: 107 MMOL/L (ref 98–107)
CO2 SERPL-SCNC: 26 MMOL/L (ref 22–30)
EOSINOPHIL # BLD AUTO: 0.1 K/UL (ref 0–0.7)
EOSINOPHIL NFR BLD AUTO: 2 %
ERYTHROCYTE [DISTWIDTH] IN BLOOD BY AUTOMATED COUNT: 4.53 M/UL (ref 4.3–5.9)
ERYTHROCYTE [DISTWIDTH] IN BLOOD: 12.8 % (ref 11.5–15.5)
GLUCOSE BLD-MCNC: 187 MG/DL (ref 75–99)
GLUCOSE BLD-MCNC: 192 MG/DL (ref 75–99)
GLUCOSE SERPL-MCNC: 64 MG/DL (ref 74–99)
HCT VFR BLD AUTO: 43.3 % (ref 39–53)
HDW: 2.51
HGB BLD-MCNC: 14.5 GM/DL (ref 13–17.5)
INR PPP: 1.1 (ref ?–1.1)
LUC NFR BLD AUTO: 3 %
LYMPHOCYTES # SPEC AUTO: 2.1 K/UL (ref 1–4.8)
LYMPHOCYTES NFR SPEC AUTO: 28 %
MCH RBC QN AUTO: 32.1 PG (ref 25–35)
MCHC RBC AUTO-ENTMCNC: 33.6 G/DL (ref 31–37)
MCV RBC AUTO: 95.6 FL (ref 80–100)
MONOCYTES # BLD AUTO: 0.7 K/UL (ref 0–1)
MONOCYTES NFR BLD AUTO: 9 %
NEUTROPHILS # BLD AUTO: 4.2 K/UL (ref 1.3–7.7)
NEUTROPHILS NFR BLD AUTO: 57 %
NON-AFRICAN AMERICAN GFR(MDRD): 48
POTASSIUM SERPL-SCNC: 3.9 MMOL/L (ref 3.5–5.1)
PROT SERPL-MCNC: 7.3 G/DL (ref 6.3–8.2)
PT BLD: 10.8 SEC (ref 9–12)
SODIUM SERPL-SCNC: 144 MMOL/L (ref 137–145)
WBC # BLD AUTO: 0.24 10*3/UL
WBC # BLD AUTO: 7.3 K/UL (ref 3.8–10.6)
WBC (PEROX): 7.24

## 2017-05-23 PROCEDURE — 85610 PROTHROMBIN TIME: CPT

## 2017-05-23 PROCEDURE — 36415 COLL VENOUS BLD VENIPUNCTURE: CPT

## 2017-05-23 PROCEDURE — 82550 ASSAY OF CK (CPK): CPT

## 2017-05-23 PROCEDURE — 94760 N-INVAS EAR/PLS OXIMETRY 1: CPT

## 2017-05-23 PROCEDURE — 71020: CPT

## 2017-05-23 PROCEDURE — 96374 THER/PROPH/DIAG INJ IV PUSH: CPT

## 2017-05-23 PROCEDURE — 85652 RBC SED RATE AUTOMATED: CPT

## 2017-05-23 PROCEDURE — 83036 HEMOGLOBIN GLYCOSYLATED A1C: CPT

## 2017-05-23 PROCEDURE — 84484 ASSAY OF TROPONIN QUANT: CPT

## 2017-05-23 PROCEDURE — 93005 ELECTROCARDIOGRAM TRACING: CPT

## 2017-05-23 PROCEDURE — 80053 COMPREHEN METABOLIC PANEL: CPT

## 2017-05-23 PROCEDURE — 93971 EXTREMITY STUDY: CPT

## 2017-05-23 PROCEDURE — 85025 COMPLETE CBC W/AUTO DIFF WBC: CPT

## 2017-05-23 PROCEDURE — 82553 CREATINE MB FRACTION: CPT

## 2017-05-23 PROCEDURE — 99285 EMERGENCY DEPT VISIT HI MDM: CPT

## 2017-05-23 RX ADMIN — ATORVASTATIN CALCIUM SCH MG: 40 TABLET, FILM COATED ORAL at 21:53

## 2017-05-23 RX ADMIN — APIXABAN SCH MG: 5 TABLET, FILM COATED ORAL at 21:53

## 2017-05-23 RX ADMIN — INSULIN LISPRO SCH UNIT: 100 INJECTION, SOLUTION INTRAVENOUS; SUBCUTANEOUS at 22:05

## 2017-05-23 NOTE — XR
EXAMINATION TYPE: XR chest 2V

 

DATE OF EXAM: 5/23/2017 5:56 PM

 

COMPARISON: 3/20/2017

 

HISTORY: Pneumonia chest pain

 

TECHNIQUE:  Frontal and lateral views of the chest are obtained.

 

FINDINGS:  Heart and mediastinum appear normal. Lungs are clear. Diaphragm is normal. Bony thorax is 
intact.

 

IMPRESSION:  Normal chest. No evidence of bronchopneumonia. No significant change.

## 2017-05-23 NOTE — ED
General Adult HPI





- General


Chief complaint: Dizziness


Stated complaint: heart?


Time Seen by Provider: 05/23/17 16:33


Source: patient, family, RN notes reviewed, old records reviewed


Mode of arrival: wheelchair


Limitations: no limitations





- History of Present Illness


Initial comments: 





Chief complaint and history of present illness this is a 61-year-old male with 

a complaint of not feeling well and diaphoretic.  His blood sugar was only 48.  

The patient does come from the doctor's office.  He was a because over the past 

week having back pain chest pain sweats.  Past history of A. fib.





- Related Data


 Home Medications











 Medication  Instructions  Recorded  Confirmed


 


Fenofibrate Nanocrystallized 145 mg PO QAM 02/09/16 05/23/17





[Tricor]   


 


Insulin NPH Hum/Reg Insulin Hm 55 unit SQ AC-BID 02/09/16 05/23/17





[NovoLIN 70-30 100 UNIT/ML VIAL]   


 


Simvastatin [Zocor] 80 mg PO HS 02/09/16 05/23/17


 


Digoxin [Digitek] 125 mcg PO DAILY 03/19/17 05/23/17


 


Ergocalciferol [Vitamin D2 50,000 unit PO SA 03/19/17 05/23/17





(DRISDOL)]   








 Previous Rx's











 Medication  Instructions  Recorded


 


Apixaban [Eliquis] 5 mg PO BID #60 tab 03/21/17











 Allergies











Allergy/AdvReac Type Severity Reaction Status Date / Time


 


No Known Allergies Allergy   Verified 05/23/17 17:07














Review of Systems


ROS Statement: 


Those systems with pertinent positive or pertinent negative responses have been 

documented in the HPI.


Review of systems confused initially and after receiving sugar both by mouth 

and IV was more alert and answered questions appropriately.  Currently not 

complaining of any headache no visual acuity changes he does have some back 

discomfort between the scapular region.  No shortness of breath this time no 

longer diaphoretic.  No nausea no vomiting no neuro deficits.  All systems are 

reviewed.





Past medical problems significant for A. fib on L Oquist.  Insulin-dependent 

diabetes mellitus, no change in medications no significant change in weight 

other than gaining weight.  Also history of hyperlipidemia.  Surgeries include 

tonsillectomy and hernia repair.  Family history grandfather colon cancer.  

Patient denies any ALLERGIES he does smoke strongly encouraged to stop denies 

alcohol use.


ROS Other: All systems not noted in ROS Statement are negative.





Past Medical History


Past Medical History: Atrial Fibrillation, Coronary Artery Disease (CAD), 

Diabetes Mellitus, Hyperlipidemia


Additional Past Medical History / Comment(s): " HEART PALPITATIONS SEVERAL 

YEARS AGO"


History of Any Multi-Drug Resistant Organisms: None Reported


Past Surgical History: Hernia Repair, Tonsillectomy


Past Anesthesia/Blood Transfusion Reactions: No Reported Reaction


Past Psychological History: No Psychological Hx Reported


Smoking Status: Current every day smoker


Past Alcohol Use History: None Reported


Additional Past Alcohol Use History / Comment(s): 1/2 PPD since 1972 44 years


Past Drug Use History: None Reported





- Past Family History


  ** Mother


Family Medical History: Diabetes Mellitus





  ** Father


Additional Family Medical History / Comment(s): ulcers, "lung aneurysm passed 

from"





General Exam





- General Exam Comments


Initial Comments: 





General:


The patient is awake and arise emergency room hypoglycemic state.  Blood sugar 

48 mildly confused and diaphoretic.  Patient was given sweet drink and IV 

glucose which significantly improved his condition.  Patient's vital signs 

shows temperature 96.9 pulse 1 respiratory rate 20 pulse ox 96% room air blood 

pressure 111/62


Eye:


Pupils are equal, round and reactive to light, extra-ocular movements are intact

; there is normal conjunctiva bilaterally. No signs of icterus. 


Ears, nose, mouth and throat:


There are moist mucous membranes and no oral lesions.  Patient complains of a 

sore throat for months and he points to the area around the angle of Joshua in 

his upper chest lower neck area.  Patient was told to bring this up to his 

family physician as he may well need to have ENT or GI specialist scope the 

area.  No change in 4 days.  No coughing or blood.  No change in weight.  No 

difficulty swallowing.


Neck:


The neck is supple, there is no tenderness , no jugular venous distention.


Cardiovascular:


A regular rate and rhythm.  No murmur, rub or gallop is appreciated.


Respiratory:


Lungs are clear to auscultation, respirations are non-labored, breath sounds 

are equal. No wheezes, stridor, rales, or rhonchi.


Gastrointestinal:


Soft, non-distended, non-tender abdomen without masses or organomegaly noted. 

There is no rebound or guarding present. No CVA tenderness. Bowel sounds are 

unremarkable.


Back:


There is no tenderness to palpation in the midline. There is no obvious 

deformity. No rashes noted.  Complains of discomfort to the area between his 

scapula for the past 4 days.


Musculoskeletal:


Normal ROM, no tenderness, There is no pedal edema. There is no calf tenderness 

or swelling. Sensation intact. Pulses equal bilaterally 2+. 


Neurological:


CN II-XII intact, There are no obvious motor or sensory deficits. Coordination 

appears grossly intact. Speech is normal.  After receiving oral glucose and IV 

glucose the patient's neuro exam was normal.  Initially when his blood sugar 

was low he was confused.


Skin:


Skin is warm and dry and no rashes or lesions are noted.  Diaphoresis subsided 

once his blood sugar was corrected.


 


Limitations: no limitations





Course


 Vital Signs











  05/23/17





  16:27


 


Temperature 96.9 F L


 


Pulse Rate 81


 


Respiratory 20





Rate 


 


Blood Pressure 111/62


 


O2 Sat by Pulse 96





Oximetry 














EKG Findings





- EKG Comments:


EKG Findings:: EKG was done at 1623 showing what appears to be A. fib.  Rate 80 

QRS duration 106  QTc 45.  Dr. Obregon





Medical Decision Making





- Medical Decision Making





Medical decision making the patient's white count 7 hemoglobin 14 hematocrit of 

43 with an INR 1.1.  The patient's potassium is 3.9 BUN 27 creatinine 1.48 with 

a GFR of 48.  Glucose initially 48 at bedside and then after IV glucose 187 on 

recheck.  The patient's troponin is less than 0.012





Chest x-ray was done and reviewed by radiologist his impression is normal 

chest.  No evidence of bronchopneumonia.  No significant change.  As read by 

Dr. Andrade











The patient states case discussed with his attending Dr. Lawrence, patient be 

admitted to his service diagnosis of, chest pain, chronic kidney disease and 

hypoglycemic reaction





- Lab Data


Result diagrams: 


 05/23/17 16:45





 05/23/17 16:45


 Lab Results











  05/23/17 05/23/17 05/23/17 Range/Units





  16:45 16:45 16:45 


 


WBC  7.3    (3.8-10.6)  k/uL


 


RBC  4.53    (4.30-5.90)  m/uL


 


Hgb  14.5    (13.0-17.5)  gm/dL


 


Hct  43.3    (39.0-53.0)  %


 


MCV  95.6    (80.0-100.0)  fL


 


MCH  32.1    (25.0-35.0)  pg


 


MCHC  33.6    (31.0-37.0)  g/dL


 


RDW  12.8    (11.5-15.5)  %


 


Plt Count  208    (150-450)  k/uL


 


Neutrophils %  57    %


 


Lymphocytes %  28    %


 


Monocytes %  9    %


 


Eosinophils %  2    %


 


Basophils %  1    %


 


Neutrophils #  4.2    (1.3-7.7)  k/uL


 


Lymphocytes #  2.1    (1.0-4.8)  k/uL


 


Monocytes #  0.7    (0-1.0)  k/uL


 


Eosinophils #  0.1    (0-0.7)  k/uL


 


Basophils #  0.0    (0-0.2)  k/uL


 


PT    10.8  (9.0-12.0)  sec


 


INR    1.1  (<1.1)  


 


Sodium   144   (137-145)  mmol/L


 


Potassium   3.9   (3.5-5.1)  mmol/L


 


Chloride   107   ()  mmol/L


 


Carbon Dioxide   26   (22-30)  mmol/L


 


Anion Gap   11   mmol/L


 


BUN   27 H   (9-20)  mg/dL


 


Creatinine   1.48 H   (0.66-1.25)  mg/dL


 


Est GFR (MDRD) Af Amer   59   (>60 ml/min/1.73 sqM)  


 


Est GFR (MDRD) Non-Af   48   (>60 ml/min/1.73 sqM)  


 


Glucose   64 L   (74-99)  mg/dL


 


POC Glucose (mg/dL)     (75-99)  mg/dL


 


POC Glu Operater ID     


 


Calcium   10.1   (8.4-10.2)  mg/dL


 


Total Bilirubin   0.5   (0.2-1.3)  mg/dL


 


AST   26   (17-59)  U/L


 


ALT   37   (21-72)  U/L


 


Alkaline Phosphatase   50   ()  U/L


 


Troponin I     (0.000-0.034)  ng/mL


 


Total Protein   7.3   (6.3-8.2)  g/dL


 


Albumin   4.2   (3.5-5.0)  g/dL














  05/23/17 05/23/17 Range/Units





  16:45 17:19 


 


WBC    (3.8-10.6)  k/uL


 


RBC    (4.30-5.90)  m/uL


 


Hgb    (13.0-17.5)  gm/dL


 


Hct    (39.0-53.0)  %


 


MCV    (80.0-100.0)  fL


 


MCH    (25.0-35.0)  pg


 


MCHC    (31.0-37.0)  g/dL


 


RDW    (11.5-15.5)  %


 


Plt Count    (150-450)  k/uL


 


Neutrophils %    %


 


Lymphocytes %    %


 


Monocytes %    %


 


Eosinophils %    %


 


Basophils %    %


 


Neutrophils #    (1.3-7.7)  k/uL


 


Lymphocytes #    (1.0-4.8)  k/uL


 


Monocytes #    (0-1.0)  k/uL


 


Eosinophils #    (0-0.7)  k/uL


 


Basophils #    (0-0.2)  k/uL


 


PT    (9.0-12.0)  sec


 


INR    (<1.1)  


 


Sodium    (137-145)  mmol/L


 


Potassium    (3.5-5.1)  mmol/L


 


Chloride    ()  mmol/L


 


Carbon Dioxide    (22-30)  mmol/L


 


Anion Gap    mmol/L


 


BUN    (9-20)  mg/dL


 


Creatinine    (0.66-1.25)  mg/dL


 


Est GFR (MDRD) Af Amer    (>60 ml/min/1.73 sqM)  


 


Est GFR (MDRD) Non-Af    (>60 ml/min/1.73 sqM)  


 


Glucose    (74-99)  mg/dL


 


POC Glucose (mg/dL)   187 H  (75-99)  mg/dL


 


POC Glu Operater ID   Blank Humphreys  


 


Calcium    (8.4-10.2)  mg/dL


 


Total Bilirubin    (0.2-1.3)  mg/dL


 


AST    (17-59)  U/L


 


ALT    (21-72)  U/L


 


Alkaline Phosphatase    ()  U/L


 


Troponin I  <0.012   (0.000-0.034)  ng/mL


 


Total Protein    (6.3-8.2)  g/dL


 


Albumin    (3.5-5.0)  g/dL














Disposition


Clinical Impression: 


 Hypoglycemic reaction, Chronic kidney disease, Chest pain of uncertain etiology





Disposition: ADMITTED AS IP TO THIS HOSP


Condition: Fair


Referrals: 


Kj Lawrence MD [Primary Care Provider] - 1-2 days

## 2017-05-24 VITALS — RESPIRATION RATE: 18 BRPM

## 2017-05-24 LAB
CK SERPL-CCNC: 124 U/L (ref 55–170)
CK SERPL-CCNC: 134 U/L (ref 55–170)
GLUCOSE BLD-MCNC: 145 MG/DL (ref 75–99)
GLUCOSE BLD-MCNC: 301 MG/DL (ref 75–99)
GLUCOSE BLD-MCNC: 302 MG/DL (ref 75–99)
GLUCOSE BLD-MCNC: 325 MG/DL (ref 75–99)
GLUCOSE BLD-MCNC: 47 MG/DL (ref 75–99)
HEMOGLOBIN A1C: 9.7 % (ref 4.2–6.1)
TROPONIN I SERPL-MCNC: <0.012 NG/ML (ref 0–0.03)
TROPONIN I SERPL-MCNC: <0.012 NG/ML (ref 0–0.03)

## 2017-05-24 RX ADMIN — DIGOXIN SCH MCG: 125 TABLET ORAL at 08:30

## 2017-05-24 RX ADMIN — APIXABAN SCH MG: 5 TABLET, FILM COATED ORAL at 20:08

## 2017-05-24 RX ADMIN — INSULIN LISPRO SCH UNIT: 100 INJECTION, SOLUTION INTRAVENOUS; SUBCUTANEOUS at 20:09

## 2017-05-24 RX ADMIN — INSULIN LISPRO SCH UNIT: 100 INJECTION, SOLUTION INTRAVENOUS; SUBCUTANEOUS at 17:55

## 2017-05-24 RX ADMIN — INSULIN LISPRO SCH UNIT: 100 INJECTION, SOLUTION INTRAVENOUS; SUBCUTANEOUS at 12:31

## 2017-05-24 RX ADMIN — CEFAZOLIN SCH: 330 INJECTION, POWDER, FOR SOLUTION INTRAMUSCULAR; INTRAVENOUS at 11:29

## 2017-05-24 RX ADMIN — INSULIN LISPRO SCH UNIT: 100 INJECTION, SOLUTION INTRAVENOUS; SUBCUTANEOUS at 08:30

## 2017-05-24 RX ADMIN — ATORVASTATIN CALCIUM SCH MG: 40 TABLET, FILM COATED ORAL at 20:08

## 2017-05-24 RX ADMIN — APIXABAN SCH MG: 5 TABLET, FILM COATED ORAL at 08:29

## 2017-05-24 RX ADMIN — CEFAZOLIN SCH MLS/HR: 330 INJECTION, POWDER, FOR SOLUTION INTRAMUSCULAR; INTRAVENOUS at 20:08

## 2017-05-24 RX ADMIN — CEFAZOLIN SCH: 330 INJECTION, POWDER, FOR SOLUTION INTRAMUSCULAR; INTRAVENOUS at 08:22

## 2017-05-24 NOTE — PN
CHIEF COMPLAINT: Posterior chest pain. 



HISTORY OF PRESENT ILLNESS: This gentleman is still having the same 

discomfort in the back. He is not feverish or diaphoretic. He has had 

no neurologic symptoms in the upper extremities.  



PHYSICAL EXAMINATION: Chest is clear. Cardiac exam is normal. Carotids 

are normal. Abdomen is soft, non-tender. Neurologically he is intact.  



IMPRESSION: 

1. Posterior chest pain. 

2. Chronic obstructive pulmonary disease. 

3. Diabetes. 



PLAN: 

1. Cardiology consult. 

2. Thoracic surgery consult. 

3. Wait for the balance of his studies, including enzymes and EKGs.

## 2017-05-24 NOTE — US
EXAMINATION TYPE: US venous doppler duplex UE LT

 

DATE OF EXAM: 5/24/2017 10:41 AM

 

COMPARISON: NONE

 

CLINICAL HISTORY: rule out dvt. Left hand edema 4-5 months, pain left hand, patient on blood thinner 
for A FIB

 

SIDE PERFORMED: left

 

 

 

 

Left Arm: No evidence of DVT. Superficial thrombus noted left lower arm

 

 

 

IMPRESSION: 

No evidence of acute DVT. Exam is positive for SVT within the lower arm.

## 2017-05-24 NOTE — P.HPIM
History of Present Illness


H&P Date: 05/24/17


A 61-year-old male presented on day admission to the emergency room to be 

evaluated for a chief complaint of feeling chest tightness radiating between 

the shoulder blades dizziness lightheadedness and broke out in a sweat.  In the 

emergency room the blood sugar was noted to be 48.  Patient reportedly had been 

experiencing the above-mentioned symptoms on and off for the last week.  

Patient stated the only thing that was new was that he had been working in his 

garden.  Patient stated that he also had noted that for the last 2 months he 

has had swelling involving his left hand and forearm.  Patient does have a 

history of persistent atrial fibrillation is on elquist patient stated to his 

knowledge she did not hurt the left arm.  Patient states that he did see his 

cardiologist dr zamorano within the last month.  Patient did have a Doppler study 

done of the left upper arm it showed superficial thrombus noted left lower arm 

no evidence of a DVT in the left arm.


Patient has type 2 diabetes hemoglobin A1c is 9.7 blood sugars are ranging 145--

192





Review of Systems





Essentially unremarkable except as mentioned in the present illness





Past Medical History


Past Medical History: Atrial Fibrillation, Coronary Artery Disease (CAD), 

Diabetes Mellitus, Hyperlipidemia, Renal Disease


Additional Past Medical History / Comment(s): " HEART PALPITATIONS SEVERAL 

YEARS AGO"


History of Any Multi-Drug Resistant Organisms: None Reported


Past Surgical History: Hernia Repair, Tonsillectomy


Past Anesthesia/Blood Transfusion Reactions: No Reported Reaction


Past Psychological History: No Psychological Hx Reported


Smoking Status: Current every day smoker


Past Alcohol Use History: None Reported


Additional Past Alcohol Use History / Comment(s): 1/2 PPD since 1972 44 years


Past Drug Use History: None Reported





- Past Family History


  ** Mother


Family Medical History: Diabetes Mellitus





  ** Father


Additional Family Medical History / Comment(s): ulcers, "lung aneurysm passed 

from"





Medications and Allergies


 Home Medications











 Medication  Instructions  Recorded  Confirmed  Type


 


Fenofibrate Nanocrystallized 145 mg PO QAM 02/09/16 05/23/17 History





[Tricor]    


 


Insulin NPH Hum/Reg Insulin Hm 55 unit SQ AC-BID 02/09/16 05/23/17 History





[NovoLIN 70-30 100 UNIT/ML VIAL]    


 


Simvastatin [Zocor] 80 mg PO HS 02/09/16 05/23/17 History


 


Digoxin [Digitek] 125 mcg PO DAILY 03/19/17 05/23/17 History


 


Ergocalciferol [Vitamin D2 50,000 unit PO SA 03/19/17 05/23/17 History





(DRISDOL)]    











 Allergies











Allergy/AdvReac Type Severity Reaction Status Date / Time


 


No Known Allergies Allergy   Verified 05/23/17 17:07














Physical Exam


Vitals: 


 Vital Signs











  Temp Pulse Pulse Resp BP BP Pulse Ox


 


 05/24/17 12:00  98.3 F   73  18   104/58  95


 


 05/24/17 08:00  98.6 F   72  16   115/66  93 L


 


 05/24/17 07:31        95


 


 05/24/17 04:00  98.6 F   67  18   128/79  95


 


 05/23/17 23:44     16   


 


 05/23/17 23:18  98.2 F   78  16   102/67  96


 


 05/23/17 20:00  97.9 F   70  18   131/86  96


 


 05/23/17 18:52  97.9 F  73   20  106/59   95


 


 05/23/17 18:43  98.7 F  82   15  143/72   97


 


 05/23/17 16:27  96.9 F L  81   20  111/62   96








 Intake and Output











 05/24/17 05/24/17 05/24/17





 06:59 14:59 22:59


 


Other:   


 


  Voiding Method  Toilet 


 


  # Voids 1  














GENERAL APPEARANCE: The patient is alert, oriented, in no acute distress.  Up 

ambulating in the room denying dizziness lightheadedness


VITAL SIGNS: Reviewed


HEENT: Head is normocephalic and atraumatic. Pupils are equal and reactive. The 

nares are patent. Oropharynx is clear without lesions.


NECK: Supple without lymphadenopathy. Traches midline.


HEART: S1, S2.  Irregular no murmur noted denying chest pain


LUNGS: No crackles or wheezes are heard.  On room air no shortness of breath no 

cough


ABDOMEN: Soft, nontender, nondistended with good bowel sounds. No peritoneal 

signs. No palpable organomegaly or masses.


EXTREMITIES: Normal skin color and turgor.  The left hand to the forearm puffy 

swollen palpable.Radial pedal pulses are 2/4 bilaterally.


NEUROLOGICAL: No focal deficits. Strength and sensation are grossly intact.








Results


CBC & Chem 7: 


 05/23/17 16:45





 05/23/17 16:45


Labs: 


 Abnormal Lab Results - Last 24 Hours (Table)











  05/23/17 05/23/17 05/23/17 Range/Units





  16:29 16:45 17:19 


 


BUN   27 H   (9-20)  mg/dL


 


Creatinine   1.48 H   (0.66-1.25)  mg/dL


 


Glucose   64 L   (74-99)  mg/dL


 


POC Glucose (mg/dL)  47 L   187 H  (75-99)  mg/dL


 


Hemoglobin A1c     (4.2-6.1)  %














  05/23/17 05/24/17 05/24/17 Range/Units





  20:35 04:38 06:39 


 


BUN     (9-20)  mg/dL


 


Creatinine     (0.66-1.25)  mg/dL


 


Glucose     (74-99)  mg/dL


 


POC Glucose (mg/dL)  192 H   145 H  (75-99)  mg/dL


 


Hemoglobin A1c   9.7 H   (4.2-6.1)  %














  05/24/17 Range/Units





  12:07 


 


BUN   (9-20)  mg/dL


 


Creatinine   (0.66-1.25)  mg/dL


 


Glucose   (74-99)  mg/dL


 


POC Glucose (mg/dL)  301 H  (75-99)  mg/dL


 


Hemoglobin A1c   (4.2-6.1)  %














Thrombosis Risk Factor Assmnt





- Choose All That Apply


Each Factor Represents 1 point: Age 41-60 years


Thrombosis Risk Factor Assessment Total Risk Factor Score: 1


Thrombosis Risk Factor Assessment Level: Low Risk





Assessment and Plan


Plan: 





Impression


Present on admission dizziness lightheadedness diaphoresis suspect due to 

hypoglycemia blood sugar 46


Type 2 diabetes insulin requiring uncontrolled hemoglobin A1c 9.7


Chronic persistent atrial fibrillation controlled ventricular response


Current every day smoker greater than a 40 year history


COPD with no evidence of acute exacerbation








Plan


Await cardiology input


Resume home meds as appropriate


Smoking cessation information provided patient's been as spiced to stop smoking 

cigarettes


Continue to monitor blood sugars address as indicated


DVT and GI prophylaxis


Further recommendations pending














The above dictated assessment and findings were discussed with dr rizzo .  

Impression and the plan of care have been dictated as directed.  Jewels Louise 

nurse practitioner acting as a scribe for dr rizzo

## 2017-05-25 VITALS — HEART RATE: 80 BPM | SYSTOLIC BLOOD PRESSURE: 108 MMHG | DIASTOLIC BLOOD PRESSURE: 67 MMHG | TEMPERATURE: 98.5 F

## 2017-05-25 LAB
ALP SERPL-CCNC: 50 U/L (ref 38–126)
ALT SERPL-CCNC: 34 U/L (ref 21–72)
ANION GAP SERPL CALC-SCNC: 8 MMOL/L
AST SERPL-CCNC: 20 U/L (ref 17–59)
BUN SERPL-SCNC: 24 MG/DL (ref 9–20)
CALCIUM SPEC-MCNC: 9.7 MG/DL (ref 8.4–10.2)
CHLORIDE SERPL-SCNC: 103 MMOL/L (ref 98–107)
CO2 SERPL-SCNC: 28 MMOL/L (ref 22–30)
GLUCOSE BLD-MCNC: 250 MG/DL (ref 75–99)
GLUCOSE SERPL-MCNC: 257 MG/DL (ref 74–99)
NON-AFRICAN AMERICAN GFR(MDRD): >60
POTASSIUM SERPL-SCNC: 5.1 MMOL/L (ref 3.5–5.1)
PROT SERPL-MCNC: 7 G/DL (ref 6.3–8.2)
SODIUM SERPL-SCNC: 139 MMOL/L (ref 137–145)

## 2017-05-25 RX ADMIN — CEFAZOLIN SCH: 330 INJECTION, POWDER, FOR SOLUTION INTRAMUSCULAR; INTRAVENOUS at 09:39

## 2017-05-25 RX ADMIN — APIXABAN SCH MG: 5 TABLET, FILM COATED ORAL at 09:30

## 2017-05-25 RX ADMIN — DIGOXIN SCH MCG: 125 TABLET ORAL at 09:31

## 2017-05-25 RX ADMIN — INSULIN LISPRO SCH UNIT: 100 INJECTION, SOLUTION INTRAVENOUS; SUBCUTANEOUS at 09:30

## 2017-05-25 NOTE — CONS
DATE OF CONSULTATION:  



Mr. Alvaro Perkins is a 61-year-old male patient who presented with a 

multitude of symptoms. He is dizzy, lightheaded, sweaty, scapular pain 

on left side, scapular pain on the right side, chest discomfort, groin 

numbness and tingling and pain.  He may have been dizzy, he is not 

sure if he passed out and has a swollen right arm for which he has 

been seeking medical attention for the last 2 months.  



Past history of diabetes. Hemoglobin A1c is 9.7.  Patient states he is 

trying very hard to control it.  History of persistent atrial 

fibrillation, coronary artery disease, diabetes, dyslipidemia, 

peripheral vascular disease and now superficial thrombosis in left 

upper extremity, which is swollen.  



SOCIAL HISTORY: He is a smoker for the last 40 years. 



His medication list was reviewed and his home medications include 

simvastatin 80 mg daily, insulin, TriCor, digoxin, apixaban.  

Currently, he is on apixaban.  



His labs are reviewed, potassium 5.1. Blood sugar is high, kidney 

functions are normal.  Cardiac enzymes are normal. BUN 27, creatinine 

1.48. Hemoglobin is normal. White count is normal.  



On examination, his blood pressure is 124/93 mmHg.  He is afebrile. 

Pulse rate in the 70s.  

Respirations normal. Heart sounds S1, S2 are soft but irregular. 

Breath sounds are normal. No rhonchi. No crackles. Abdomen is soft, 

nontender.  

EXTREMITIES: Warm. No edema. 



IMPRESSION: 

1. A multitude of symptoms with documented low blood sugar but mostly 

high blood sugar, and uncontrolled diabetes with a hemoglobin A1c 9.7. 

 

2. Current smoker. 

3. Peripheral vascular disease. 

4. Atypical chest discomfort with normal cardiac enzymes so far. 

5. Persistent atrial fibrillation, rate controlled on digoxin. 

6. Superficial thrombosis in the left upper extremity of unclear 

etiology.  



SUGGEST: 

1. Outpatient follow with Dr. rGay within a week and a cardiac work-up. 

2. Diabetes control, the patient diabetes is not controlled and he 

should be referred to Endocrinology.  

3. Smoking cessation. 

4. I would switch to atorvastatin 40 mg p.o. daily at home. 

5. I would start low dose ACE inhibitors or angiotensins receptor 

blockers 25 mg p.o. daily.  

6. Continue apixaban for anticoagulation for stroke prevention and 

also anticoagulation now for the superficial thrombophlebitis in the 

left arm and Hematology assessment and the reasons for upper extremity 

thrombosis.  I had a detailed discussion with the patient and his wife 

regarding the importance of primary prevention of vascular disease 

including control of diabetes, smoking cessation and I discussed this 

with the nurse practitioner.

## 2017-05-25 NOTE — P.DS
Providers


Date of admission: 


05/23/17 18:16





Expected date of discharge: 05/25/17


Attending physician: 


Kj Lawrence





Consults: 





 





05/24/17 08:47


Consult Physician Urgent 


   Consulting Provider: Ez Yen


   Consult Reason/Comments: low bp


   Do you want consulting provider notified?: Yes











Primary care physician: 


Kj Lawrence





[facility] Course: 





A 61-year-old gentleman presented on the day of admission to be evaluated for 

multiple symptoms.  Patient stated he felt dizzy lightheaded broke out in a 

sweat and had pain between his shoulder blades more on the right side than the 

left.  Felt a tingly sensation.  Patient stated I felt like I could pass out.  

Patient also reportedly has been experiencing for the last 2 months swollen 

hand and forearm.  Patient was seen in the emergency room and the blood sugar 

was 48.  The hemoglobin A1c 9.7.  Patient states it's hard for him to control 

his diabetes.  The Doppler study to the left upper extremity showed a 

superficial thrombosis.  Patient has been on anticoagulation for persistent 

atrial fibrillation elquist at home and states he's been compliant with taking 

it.  Patient was seen this admission by cardiology service.  As mentioned 

patient does have a superficial thrombus in the left upper arm unclear 

etiology.  Recommendations by cardiology service were to start a low-dose ACE 

inhibitor the beta blocker and monitor the response and to continue with the 

anticoagulation for stroke prevention as mentioned patient is on elquist 

additionally the patient was seen by the diabetic educator did receive 

information to help the patient control his diabetes.  Additionally patient was 

provided with smoking cessation information once again the patient has been 

advised to visit to stop smoking cigarettes.  And on the day of discharge 

appointment was made for the patient to follow-up with his primary cardiologist 

dr gray to be seen within a week.  Additionally the patient was scheduled to 

see an endocrinologist within the community in the outpatient setting dr alvarez 

additionally set up for an appointment for the patient to see dr stewart for the 

unclear etiology with a superficial blood clot in the left upper extremity.  

Patient and wife were updated on the plan of care both verbalized an 

understanding patient was subsequently discharged recommendations by cardiology 

were initiated.











Impression discharge diagnosis


Present on admission atypical chest pain no evidence of acute coronary syndrome 

cardiac enzymes negative 3 sets


Present on admission left hand forearm edematous Doppler study showed 

superficial thrombus in the left upper extremity unclear etiology


Present on admission hypoglycemia blood sugar 48 symptomatic diaphoretic dizzy 

lightheadedness


Type 2 diabetes insulin requiring uncontrolled hemoglobin A1c 9.7


Peripheral vascular disease


Persistent atrial fibrillation chronic rate control


Current every day smoker greater than a 40 year history


COPD with no evidence of an acute exacerbation








The above dictated assessment and findings were discussed with dr lawrence .  

Impression and the plan of care have been dictated as directed.  Jewels Louise 

nurse practitioner acting as a scribe for dr lawrence








Patient Condition at Discharge: Fair





Plan - Discharge Summary


New Discharge Prescriptions: 


Atorvastatin [Lipitor] 40 mg PO DAILY #1 tablet


Atorvastatin [Lipitor] 40 mg PO HS #30 tab


Losartan [Cozaar] 25 mg PO DAILY #30 tab


Discharge Medication List





Insulin NPH Hum/Reg Insulin Hm [NovoLIN 70-30 100 UNIT/ML VIAL] 55 unit SQ AC-

BID 02/09/16 [History]


Digoxin [Digitek] 125 mcg PO DAILY 03/19/17 [History]


Ergocalciferol [Vitamin D2 (DRISDOL)] 50,000 unit PO SA 03/19/17 [History]


Apixaban [Eliquis] 5 mg PO BID #60 tab 03/21/17 [Rx]


Atorvastatin [Lipitor] 40 mg PO DAILY #1 tablet 05/25/17 [Rx]


Atorvastatin [Lipitor] 40 mg PO HS #30 tab 05/25/17 [Rx]


Losartan [Cozaar] 25 mg PO DAILY #30 tab 05/25/17 [Rx]








Follow up Appointment(s)/Referral(s): 


Deonte Stewart MD [STAFF PHYSICIAN] - 1 Week


Kj Lawrence MD [Primary Care Provider] - 1-2 days


Jesus Gray MD [STAFF PHYSICIAN] - 1 Week


Cristine Alvarez MD [STAFF PHYSICIAN] - 1 Week


Patient Instructions/Handouts:  Hypoglycemia in a Person with Diabetes (GEN)


Activity/Diet/Wound Care/Special Instructions: 


The diabetic educator did see the patient this admission provided information 

about the diabetic education classes


Patient was provided with smoking cessation information has been advised to 

stop smoking cigarettes


Discharge Disposition: HOME SELF-CARE

## 2017-05-25 NOTE — PN
DATE OF SERVICE: 05/25/2017



CHIEF COMPLAINT: Posterior chest pain and shortness of breath with 

diaphoresis.  



HISTORY OF PRESENT ILLNESS: This gentleman is doing well, but he still 

has a little bit of back pain. He has been cleared by Cardiology. All 

of his studies have been negative. He was complaining of some swelling 

of the left hand, and apparently there is a venous thrombosis in the 

left arm. He has no pain, weakness, dysesthesias, etc.  



PHYSICAL EXAMINATION: His chest is clear. The cardiac exam is normal. 

Abdomen is soft, nontender. Extremities are normal except for the 

swelling in the left hand and wrist.  



IMPRESSION: 

1. Chest pain, back pain, diaphoresis and shortness of breath, 

etiology unknown.  

2. Edema in the left hand secondary to venous thrombus in the left arm. 

3. Chronic obstructive pulmonary disease. 

4. Hypertension. 

5. Diabetes. 

6. Chronic kidney disease. 

7. Hypoglycemia. 



PLAN: Patient is probably going to be going home later today, and this 

will be arranged by the nurse practitioner.

## 2018-02-01 ENCOUNTER — HOSPITAL ENCOUNTER (EMERGENCY)
Dept: HOSPITAL 47 - EC | Age: 63
Discharge: HOME | End: 2018-02-01
Payer: COMMERCIAL

## 2018-02-01 VITALS
RESPIRATION RATE: 18 BRPM | DIASTOLIC BLOOD PRESSURE: 55 MMHG | HEART RATE: 67 BPM | TEMPERATURE: 97.8 F | SYSTOLIC BLOOD PRESSURE: 92 MMHG

## 2018-02-01 DIAGNOSIS — E11.9: ICD-10-CM

## 2018-02-01 DIAGNOSIS — F17.200: ICD-10-CM

## 2018-02-01 DIAGNOSIS — I25.10: ICD-10-CM

## 2018-02-01 DIAGNOSIS — J10.1: Primary | ICD-10-CM

## 2018-02-01 DIAGNOSIS — I48.91: ICD-10-CM

## 2018-02-01 DIAGNOSIS — Z79.899: ICD-10-CM

## 2018-02-01 DIAGNOSIS — Z79.4: ICD-10-CM

## 2018-02-01 DIAGNOSIS — N28.9: ICD-10-CM

## 2018-02-01 DIAGNOSIS — I10: ICD-10-CM

## 2018-02-01 DIAGNOSIS — E78.5: ICD-10-CM

## 2018-02-01 DIAGNOSIS — Z79.01: ICD-10-CM

## 2018-02-01 LAB
ALBUMIN SERPL-MCNC: 3.5 G/DL (ref 3.5–5)
ALP SERPL-CCNC: 49 U/L (ref 38–126)
ALT SERPL-CCNC: 36 U/L (ref 21–72)
ANION GAP SERPL CALC-SCNC: 10 MMOL/L
AST SERPL-CCNC: 23 U/L (ref 17–59)
BASOPHILS # BLD AUTO: 0 K/UL (ref 0–0.2)
BASOPHILS NFR BLD AUTO: 1 %
BUN SERPL-SCNC: 30 MG/DL (ref 9–20)
CALCIUM SPEC-MCNC: 9.2 MG/DL (ref 8.4–10.2)
CHLORIDE SERPL-SCNC: 97 MMOL/L (ref 98–107)
CO2 SERPL-SCNC: 27 MMOL/L (ref 22–30)
EOSINOPHIL # BLD AUTO: 0 K/UL (ref 0–0.7)
EOSINOPHIL NFR BLD AUTO: 1 %
ERYTHROCYTE [DISTWIDTH] IN BLOOD BY AUTOMATED COUNT: 4.22 M/UL (ref 4.3–5.9)
ERYTHROCYTE [DISTWIDTH] IN BLOOD: 12.4 % (ref 11.5–15.5)
GLUCOSE SERPL-MCNC: 363 MG/DL (ref 74–99)
HCT VFR BLD AUTO: 39.1 % (ref 39–53)
HGB BLD-MCNC: 13 GM/DL (ref 13–17.5)
LIPASE SERPL-CCNC: 219 U/L (ref 23–300)
LYMPHOCYTES # SPEC AUTO: 0.5 K/UL (ref 1–4.8)
LYMPHOCYTES NFR SPEC AUTO: 16 %
MAGNESIUM SPEC-SCNC: 1.7 MG/DL (ref 1.6–2.3)
MCH RBC QN AUTO: 30.9 PG (ref 25–35)
MCHC RBC AUTO-ENTMCNC: 33.3 G/DL (ref 31–37)
MCV RBC AUTO: 92.7 FL (ref 80–100)
MONOCYTES # BLD AUTO: 0.3 K/UL (ref 0–1)
MONOCYTES NFR BLD AUTO: 9 %
NEUTROPHILS # BLD AUTO: 2.4 K/UL (ref 1.3–7.7)
NEUTROPHILS NFR BLD AUTO: 70 %
PLATELET # BLD AUTO: 123 K/UL (ref 150–450)
POTASSIUM SERPL-SCNC: 4.5 MMOL/L (ref 3.5–5.1)
PROT SERPL-MCNC: 6.2 G/DL (ref 6.3–8.2)
SODIUM SERPL-SCNC: 134 MMOL/L (ref 137–145)
WBC # BLD AUTO: 3.4 K/UL (ref 3.8–10.6)

## 2018-02-01 PROCEDURE — 83735 ASSAY OF MAGNESIUM: CPT

## 2018-02-01 PROCEDURE — 93005 ELECTROCARDIOGRAM TRACING: CPT

## 2018-02-01 PROCEDURE — 96374 THER/PROPH/DIAG INJ IV PUSH: CPT

## 2018-02-01 PROCEDURE — 80053 COMPREHEN METABOLIC PANEL: CPT

## 2018-02-01 PROCEDURE — 87502 INFLUENZA DNA AMP PROBE: CPT

## 2018-02-01 PROCEDURE — 83690 ASSAY OF LIPASE: CPT

## 2018-02-01 PROCEDURE — 71046 X-RAY EXAM CHEST 2 VIEWS: CPT

## 2018-02-01 PROCEDURE — 99284 EMERGENCY DEPT VISIT MOD MDM: CPT

## 2018-02-01 PROCEDURE — 36415 COLL VENOUS BLD VENIPUNCTURE: CPT

## 2018-02-01 PROCEDURE — 96375 TX/PRO/DX INJ NEW DRUG ADDON: CPT

## 2018-02-01 PROCEDURE — 96361 HYDRATE IV INFUSION ADD-ON: CPT

## 2018-02-01 PROCEDURE — 85025 COMPLETE CBC W/AUTO DIFF WBC: CPT

## 2018-02-01 NOTE — XR
EXAMINATION TYPE: XR chest 2V

 

DATE OF EXAM: 2/1/2018

 

COMPARISON: 5/23/2017

 

HISTORY: Cough

 

TECHNIQUE:  Frontal and lateral views of the chest are obtained.

 

FINDINGS:  There is no heart failure nor confluent pneumonic infiltrate. There are chest leads. Costo
phrenic angles are clear. Bony thorax appears intact.

 

IMPRESSION:  No active cardiopulmonary disease. No change. Borderline cardiomegaly.

## 2018-02-01 NOTE — ED
Nausea/Vomiting/Diarrhea HPI





- General


Chief complaint: Nausea/Vomiting/Diarrhea


Stated complaint: Poss Flu


Time Seen by Provider: 02/01/18 19:47


Source: patient, family


Mode of arrival: wheelchair


Limitations: no limitations





- History of Present Illness


Initial comments: 





This is a 62-year-old male with a history of H of fibrillation, CAD, 

hypertension who presents here department for generalized body aches, muscle 

spasms, fevers chills, diarrhea, and generalized malaise.  He states it's been 

going on since 4 days ago and has gradually worsened.  The patient states that 

he has not had any nausea or vomiting.  He's had multiple episodes of diarrhea 

which she describes as watery and nonbloody.  He denies any lightheadedness.  

He does admit to a mild headache however no focal neurologic deficits.  Denies 

any rashes.  No numbness or tingling in his extremities.  No back pain.  No 

other complaints.  He does admit to some spasming in his back however has 

chronic back issues.





- Related Data


 Home Medications











 Medication  Instructions  Recorded  Confirmed


 


Digoxin [Digitek] 125 mcg PO DAILY 03/19/17 02/01/18


 


Ergocalciferol [Vitamin D2 50,000 unit PO Q30D 03/19/17 02/01/18





(DRISDOL)]   


 


Amoxicillin 250 mg PO TID 02/01/18 02/01/18


 


Atorvastatin [Lipitor] 80 mg PO HS 02/01/18 02/01/18


 


Fenofibrate Nanocrystallized 145 mg PO DAILY 02/01/18 02/01/18





[Fenofibrate]   


 


Insuln Asp Prt/Insulin Aspart 50 unit SQ HS 02/01/18 02/01/18





[NovoLOG MIX 70-30 VIAL]   


 


Insuln Asp Prt/Insulin Aspart 70 unit SQ DAILY 02/01/18 02/01/18





[NovoLOG MIX 70-30 VIAL]   


 


Rivaroxaban [Xarelto] 20 mg PO HS 02/01/18 02/01/18








 Previous Rx's











 Medication  Instructions  Recorded


 


Losartan [Cozaar] 25 mg PO DAILY #30 tab 05/25/17


 


Methocarbamol [Robaxin] 1,000 mg PO QID PRN #20 tab 02/01/18











 Allergies











Allergy/AdvReac Type Severity Reaction Status Date / Time


 


No Known Allergies Allergy   Verified 02/01/18 20:04














Review of Systems


ROS Statement: 


Those systems with pertinent positive or pertinent negative responses have been 

documented in the HPI.





ROS Other: All systems not noted in ROS Statement are negative.





Past Medical History


Past Medical History: Atrial Fibrillation, Coronary Artery Disease (CAD), 

Diabetes Mellitus, Hyperlipidemia, Renal Disease


Additional Past Medical History / Comment(s): " HEART PALPITATIONS SEVERAL 

YEARS AGO"


History of Any Multi-Drug Resistant Organisms: None Reported


Past Surgical History: Hernia Repair, Tonsillectomy


Past Anesthesia/Blood Transfusion Reactions: No Reported Reaction


Past Psychological History: No Psychological Hx Reported


Smoking Status: Current every day smoker


Past Alcohol Use History: None Reported


Past Drug Use History: None Reported





- Past Family History


  ** Mother


Family Medical History: Diabetes Mellitus





  ** Father


Additional Family Medical History / Comment(s): ulcers, "lung aneurysm passed 

from"





General Exam





- General Exam Comments


Initial Comments: 





Constitutional: Awake alert the patient appears uncomfortable


Head: Normocephalic atraumatic 


Eyes: no conjunctival injection No scleral icterus EOMI


ENT: Oropharynx is erythematous without exudate, TMs clear bilaterally


Neck: No JVD Supple


Heart: Regular rate rhythm normal S1-S2 no murmurs


Lungs: Clear to auscultation bilaterally No wheezing No rales


Abdomen: Soft nondistended nontender


Extremities: Non edematous DP pulses intact Radial pulses intact


Neuro: A&Ox3 No focal neurologic deficits


Psych: Appropriate mood and affect





Limitations: no limitations





Course


 Vital Signs











  02/01/18 02/01/18





  19:44 20:15


 


Temperature 99 F 100.1 F H


 


Pulse Rate 89 


 


Respiratory 18 24





Rate  


 


Blood Pressure 106/60 


 


O2 Sat by Pulse 95 





Oximetry  














- Reevaluation(s)


Reevaluation #1: 





02/01/18 20:30


EKG showing intrafibrillation with a rate of 68.  No abnormal ST 7 changes or 

tumor.  QTC is 361.  Other intervals normal.  No ectopy.





Medical Decision Making





- Medical Decision Making





This is a 62-year-old male who presents emergency department for generalized 

body aches, fevers, chills, and diarrhea.  He was found to be influenza A 

positive.  Was not severely dehydrated.  Was able to urinate in the emergency 

department without issue.  He felt improved after getting Toradol and Valium 

for his back.  I'm going to send him home with Robaxin and he was told to take 

Tylenol for his fever.  Needs to rest for the next 1-2 days.  Can return him in 

[place] has worsening or changing symptoms.  Otherwise needs follow-up close 

with his primary doctor.  All questions were answered.  Patient was not a 

candidate for Tamiflu as he presented outside the 48-hour window.





- Lab Data


Result diagrams: 


 02/01/18 20:25





 02/01/18 20:25


 Lab Results











  02/01/18 02/01/18 02/01/18 Range/Units





  20:25 20:25 20:25 


 


WBC  3.4 L    (3.8-10.6)  k/uL


 


RBC  4.22 L    (4.30-5.90)  m/uL


 


Hgb  13.0    (13.0-17.5)  gm/dL


 


Hct  39.1    (39.0-53.0)  %


 


MCV  92.7    (80.0-100.0)  fL


 


MCH  30.9    (25.0-35.0)  pg


 


MCHC  33.3    (31.0-37.0)  g/dL


 


RDW  12.4    (11.5-15.5)  %


 


Plt Count  123 L    (150-450)  k/uL


 


Neutrophils %  70    %


 


Lymphocytes %  16    %


 


Monocytes %  9    %


 


Eosinophils %  1    %


 


Basophils %  1    %


 


Neutrophils #  2.4    (1.3-7.7)  k/uL


 


Lymphocytes #  0.5 L    (1.0-4.8)  k/uL


 


Monocytes #  0.3    (0-1.0)  k/uL


 


Eosinophils #  0.0    (0-0.7)  k/uL


 


Basophils #  0.0    (0-0.2)  k/uL


 


Sodium   134 L   (137-145)  mmol/L


 


Potassium   4.5   (3.5-5.1)  mmol/L


 


Chloride   97 L   ()  mmol/L


 


Carbon Dioxide   27   (22-30)  mmol/L


 


Anion Gap   10   mmol/L


 


BUN   30 H   (9-20)  mg/dL


 


Creatinine   1.44 H   (0.66-1.25)  mg/dL


 


Est GFR (MDRD) Af Amer   >60   (>60 ml/min/1.73 sqM)  


 


Est GFR (MDRD) Non-Af   50   (>60 ml/min/1.73 sqM)  


 


Glucose   363 H   (74-99)  mg/dL


 


Calcium   9.2   (8.4-10.2)  mg/dL


 


Magnesium   1.7   (1.6-2.3)  mg/dL


 


Total Bilirubin   0.3   (0.2-1.3)  mg/dL


 


AST   23   (17-59)  U/L


 


ALT   36   (21-72)  U/L


 


Alkaline Phosphatase   49   ()  U/L


 


Total Protein   6.2 L   (6.3-8.2)  g/dL


 


Albumin   3.5   (3.5-5.0)  g/dL


 


Lipase   219   ()  U/L


 


Influenza Type A RNA    Detected H  (Not Detectd)  


 


Influenza Type B (PCR)    Not Detected  (Not Detectd)  














Disposition


Clinical Impression: 


 Influenza A





Disposition: HOME SELF-CARE


Condition: Stable


Instructions:  Influenza (ED)


Prescriptions: 


Methocarbamol [Robaxin] 1,000 mg PO QID PRN #20 tab


 PRN Reason: Spasms


Referrals: 


Kj Lawrence MD [Primary Care Provider] - 1-2 days

## 2019-08-23 ENCOUNTER — HOSPITAL ENCOUNTER (OUTPATIENT)
Dept: HOSPITAL 47 - RADCTMAIN | Age: 64
Discharge: HOME | End: 2019-08-23
Attending: FAMILY MEDICINE
Payer: MEDICARE

## 2019-08-23 DIAGNOSIS — Z88.8: ICD-10-CM

## 2019-08-23 DIAGNOSIS — E11.65: ICD-10-CM

## 2019-08-23 DIAGNOSIS — N28.1: ICD-10-CM

## 2019-08-23 DIAGNOSIS — N18.2: ICD-10-CM

## 2019-08-23 DIAGNOSIS — N20.0: Primary | ICD-10-CM

## 2019-08-23 DIAGNOSIS — E11.22: ICD-10-CM

## 2019-08-23 PROCEDURE — 74176 CT ABD & PELVIS W/O CONTRAST: CPT

## 2019-08-23 NOTE — CT
EXAMINATION TYPE: CT abdomen pelvis wo con

 

DATE OF EXAM: 8/23/2019

 

COMPARISON: None

 

HISTORY: Generalized abdominal pain x 3 months.

 

CT DLP: 634.7 mGycm

 

Examination of the solid and hollow viscera is limited given the lack of contrast.

 

FINDINGS: 

 

LUNG BASES: No evidence for nodule. No evidence for infiltrate.

 

LIVER/GB: The gallbladder is unremarkable. No space-occupying hepatic lesion.

 

PANCREAS: No pancreatic mass identified. No inflammatory process seen.

 

SPLEEN: No evidence for splenomegaly. No intrasplenic lesions seen.

 

ADRENALS: No adrenal nodules identified. No evidence for thickening.

 

KIDNEYS: Hypoattenuating renal lesions noted measuring 2.2 cm on the right and 2.6 cm on the left. Th
sally likely reflect simple cyst. Vascular calcifications are noted. There is also nonobstructing nephr
olithiasis.  No hydronephrosis.

 

BOWEL: Appendix has a normal appearance. No evidence of bowel obstruction. No inflammatory process.

 

Lymph nodes: No evidence for adenopathy greater than 1 cm.

 

Abdominal aorta: Atheromatous changes seen. No evidence for aneurysm.

 

Genital organs: No significant abnormality.

 

Other: Severe degenerative change L5-S1 with grade 1 anterolisthesis.

 

IMPRESSION: 

1. Renal cystic changes.

2. Nonobstructing nephrolithiasis.

## 2020-01-29 ENCOUNTER — HOSPITAL ENCOUNTER (OUTPATIENT)
Dept: HOSPITAL 47 - LABWHC1 | Age: 65
Discharge: HOME | End: 2020-01-29
Attending: NURSE PRACTITIONER
Payer: MEDICARE

## 2020-01-29 DIAGNOSIS — I10: ICD-10-CM

## 2020-01-29 DIAGNOSIS — I48.20: Primary | ICD-10-CM

## 2020-01-29 LAB
ANION GAP SERPL CALC-SCNC: 8.9 MMOL/L (ref 4–12)
BUN SERPL-SCNC: 29 MG/DL (ref 9–27)
BUN/CREAT SERPL: 18.13 RATIO (ref 12–20)
CALCIUM SPEC-MCNC: 10.1 MG/DL (ref 8.7–10.3)
CHLORIDE SERPL-SCNC: 107 MMOL/L (ref 96–109)
CHOLEST SERPL-MCNC: 171 MG/DL (ref 0–200)
CO2 SERPL-SCNC: 27.1 MMOL/L (ref 21.6–31.8)
DIGOXIN SERPL-MCNC: 0.6 NG/ML (ref 0.8–2)
GLUCOSE SERPL-MCNC: 148 MG/DL (ref 70–110)
HDLC SERPL-MCNC: 27 MG/DL (ref 40–60)
MAGNESIUM SPEC-SCNC: 2.4 MG/DL (ref 1.5–2.4)
POTASSIUM SERPL-SCNC: 4.4 MMOL/L (ref 3.5–5.5)
SODIUM SERPL-SCNC: 143 MMOL/L (ref 135–145)
TRIGL SERPL-MCNC: 630 MG/DL (ref 0–149)

## 2020-01-29 PROCEDURE — 36415 COLL VENOUS BLD VENIPUNCTURE: CPT

## 2020-01-29 PROCEDURE — 80162 ASSAY OF DIGOXIN TOTAL: CPT

## 2020-01-29 PROCEDURE — 80061 LIPID PANEL: CPT

## 2020-01-29 PROCEDURE — 80048 BASIC METABOLIC PNL TOTAL CA: CPT

## 2020-01-29 PROCEDURE — 83721 ASSAY OF BLOOD LIPOPROTEIN: CPT

## 2020-01-29 PROCEDURE — 83735 ASSAY OF MAGNESIUM: CPT

## 2020-05-27 ENCOUNTER — HOSPITAL ENCOUNTER (OUTPATIENT)
Dept: HOSPITAL 47 - LABWHC1 | Age: 65
Discharge: HOME | End: 2020-05-27
Attending: INTERNAL MEDICINE
Payer: MEDICARE

## 2020-05-27 DIAGNOSIS — Z11.59: Primary | ICD-10-CM

## 2020-05-29 ENCOUNTER — HOSPITAL ENCOUNTER (OUTPATIENT)
Dept: HOSPITAL 47 - CATHCVL | Age: 65
End: 2020-05-29
Attending: INTERNAL MEDICINE
Payer: MEDICARE

## 2020-05-29 VITALS — RESPIRATION RATE: 18 BRPM

## 2020-05-29 VITALS — TEMPERATURE: 97.2 F | HEART RATE: 55 BPM | SYSTOLIC BLOOD PRESSURE: 134 MMHG | DIASTOLIC BLOOD PRESSURE: 61 MMHG

## 2020-05-29 VITALS — BODY MASS INDEX: 30 KG/M2

## 2020-05-29 DIAGNOSIS — Z88.8: ICD-10-CM

## 2020-05-29 DIAGNOSIS — I48.11: ICD-10-CM

## 2020-05-29 DIAGNOSIS — Z79.899: ICD-10-CM

## 2020-05-29 DIAGNOSIS — Z79.4: ICD-10-CM

## 2020-05-29 DIAGNOSIS — Z79.01: ICD-10-CM

## 2020-05-29 DIAGNOSIS — I70.213: ICD-10-CM

## 2020-05-29 DIAGNOSIS — E78.5: ICD-10-CM

## 2020-05-29 DIAGNOSIS — F17.210: ICD-10-CM

## 2020-05-29 DIAGNOSIS — E11.51: Primary | ICD-10-CM

## 2020-05-29 DIAGNOSIS — I10: ICD-10-CM

## 2020-05-29 LAB
ANION GAP SERPL CALC-SCNC: 8 MMOL/L
BUN SERPL-SCNC: 30 MG/DL (ref 9–20)
CALCIUM SPEC-MCNC: 9.2 MG/DL (ref 8.4–10.2)
CHLORIDE SERPL-SCNC: 110 MMOL/L (ref 98–107)
CO2 SERPL-SCNC: 22 MMOL/L (ref 22–30)
GLUCOSE BLD-MCNC: 135 MG/DL (ref 75–99)
GLUCOSE SERPL-MCNC: 130 MG/DL (ref 74–99)
POTASSIUM SERPL-SCNC: 4.9 MMOL/L (ref 3.5–5.1)
SODIUM SERPL-SCNC: 140 MMOL/L (ref 137–145)

## 2020-05-29 PROCEDURE — 75625 CONTRAST EXAM ABDOMINL AORTA: CPT

## 2020-05-29 PROCEDURE — 80048 BASIC METABOLIC PNL TOTAL CA: CPT

## 2020-05-29 PROCEDURE — 36200 PLACE CATHETER IN AORTA: CPT

## 2020-05-29 PROCEDURE — 75716 ARTERY X-RAYS ARMS/LEGS: CPT

## 2020-05-30 NOTE — AN
ANGIOGRAPHY REPORT



PERFORMING PHYSICIAN:

Jesus Gray MD.



PROCEDURE PERFORMED:

1. Abdominal aortogram.

2. Bilateral lower extremities runoff.



INDICATION:

This is a very pleasant 64-year-old gentleman with long-standing persistent atrial

fibrillation as well as significant history of smoking who was experiencing bilateral

lower extremities intermittent claudication.  He underwent an arterial duplex study and

that revealed severe femoral-popliteal disease bilaterally and because of that he was

brought today to undergo an aortogram with runoff.



APPROACH:

Right common femoral artery.



COMPLICATION:

None.



LEVEL OF SEDATION:

Moderate with sedation length of 15 minutes.



PROCEDURE DESCRIPTION:

After obtaining an informed consent, the patient was brought to the cardiac cath lab.

The right common femoral artery was cannulated using micropuncture technique, the

micropuncture wire passed easily then I placed a 5-Finnish sheath at the right common

femoral artery.  After that I did an aortogram with runoff using 5-Finnish pigtail

catheter which was initially placed at the level of the renal arteries and it was

pulled into above the bifurcation of the aorta to right and left common iliac arteries.



The procedure was completed without any complication.



Selective peripheral angiogram:

1. The aorta appeared to be calcified with mild disease only.

2. Common iliac arteries both appear to have mild disease only.

3. Internal iliac arteries both appear to be patent.

4. External iliac arteries both appear to have mild disease only.

5. Common femoral arteries both appear to be calcified.  Both appear to have mild

    disease only.

6. The profunda, both profunda are patent.

7. The SFA:  The right SFA is occluded in the midportion and the left SFA is occluded

    in the distal portion.

8. Popliteal: The right popliteal appeared to be patent and the left popliteal

    appeared to be patent as well.

9. Below the knee:  I was able to up with opacify two vessel runoff below bilaterally

    with anterior tibial and peroneal.



CONCLUSION:

1. Mild aortoiliac disease.

2. Severe femoral-popliteal disease bilaterally with occluded bilateral SFA.

3. Two vessel runoff below the knee bilaterally with with anterior tibial and

    peroneal.

Postprocedure management will be PTA of the right and left SFA to be done in two

separate session probably from a pedal approach.





MMODL / IJN: [id number] / Job#: [id number]

## 2020-07-01 ENCOUNTER — HOSPITAL ENCOUNTER (OUTPATIENT)
Dept: HOSPITAL 47 - CATHCVL | Age: 65
LOS: 1 days | Discharge: HOME | End: 2020-07-02
Attending: INTERNAL MEDICINE
Payer: MEDICARE

## 2020-07-01 VITALS — RESPIRATION RATE: 16 BRPM

## 2020-07-01 VITALS — BODY MASS INDEX: 30.7 KG/M2

## 2020-07-01 DIAGNOSIS — I48.20: ICD-10-CM

## 2020-07-01 DIAGNOSIS — I70.213: ICD-10-CM

## 2020-07-01 DIAGNOSIS — E78.5: ICD-10-CM

## 2020-07-01 DIAGNOSIS — E11.51: Primary | ICD-10-CM

## 2020-07-01 DIAGNOSIS — I10: ICD-10-CM

## 2020-07-01 DIAGNOSIS — Z79.4: ICD-10-CM

## 2020-07-01 DIAGNOSIS — E66.9: ICD-10-CM

## 2020-07-01 DIAGNOSIS — Z79.899: ICD-10-CM

## 2020-07-01 DIAGNOSIS — F17.210: ICD-10-CM

## 2020-07-01 LAB
GLUCOSE BLD-MCNC: 108 MG/DL (ref 75–99)
GLUCOSE BLD-MCNC: 113 MG/DL (ref 75–99)
GLUCOSE BLD-MCNC: 165 MG/DL (ref 75–99)
GLUCOSE BLD-MCNC: 386 MG/DL (ref 75–99)

## 2020-07-01 PROCEDURE — 85025 COMPLETE CBC W/AUTO DIFF WBC: CPT

## 2020-07-01 PROCEDURE — 83036 HEMOGLOBIN GLYCOSYLATED A1C: CPT

## 2020-07-01 PROCEDURE — 37227: CPT

## 2020-07-01 PROCEDURE — 80048 BASIC METABOLIC PNL TOTAL CA: CPT

## 2020-07-01 PROCEDURE — 37252 INTRVASC US NONCORONARY 1ST: CPT

## 2020-07-01 PROCEDURE — 85347 COAGULATION TIME ACTIVATED: CPT

## 2020-07-01 RX ADMIN — MIDAZOLAM ONE MG: 1 INJECTION INTRAMUSCULAR; INTRAVENOUS at 09:37

## 2020-07-01 RX ADMIN — NICARDIPINE HYDROCHLORIDE ONE MCG: 2.5 INJECTION INTRAVENOUS at 09:07

## 2020-07-01 RX ADMIN — MIDAZOLAM ONE MG: 1 INJECTION INTRAMUSCULAR; INTRAVENOUS at 07:54

## 2020-07-01 RX ADMIN — MIDAZOLAM ONE MG: 1 INJECTION INTRAMUSCULAR; INTRAVENOUS at 08:56

## 2020-07-01 RX ADMIN — NITROGLYCERIN ONE MCG: 10 INJECTION INTRAVENOUS at 09:06

## 2020-07-01 RX ADMIN — INSULIN ASPART SCH UNIT: 100 INJECTION, SOLUTION INTRAVENOUS; SUBCUTANEOUS at 21:33

## 2020-07-01 RX ADMIN — MIDAZOLAM ONE MG: 1 INJECTION INTRAMUSCULAR; INTRAVENOUS at 08:32

## 2020-07-01 RX ADMIN — NITROGLYCERIN ONE MCG: 10 INJECTION INTRAVENOUS at 09:38

## 2020-07-01 RX ADMIN — HYDROMORPHONE HYDROCHLORIDE ONE MG: 1 INJECTION, SOLUTION INTRAMUSCULAR; INTRAVENOUS; SUBCUTANEOUS at 09:02

## 2020-07-01 RX ADMIN — NICARDIPINE HYDROCHLORIDE ONE MCG: 2.5 INJECTION INTRAVENOUS at 09:38

## 2020-07-01 RX ADMIN — HYDROMORPHONE HYDROCHLORIDE ONE MG: 1 INJECTION, SOLUTION INTRAMUSCULAR; INTRAVENOUS; SUBCUTANEOUS at 09:28

## 2020-07-01 NOTE — PCN
PROCEDURE NOTE



PERFORMING PHYSICIAN:

Jesus Gray M.D.



PROCEDURE PERFORMED:

1. Stenting of the right superficial femoral artery using a 7.0 x 140 and 7.0 x 140 mm

    Zilver PTX drug-coated stent with an excellent angiographic result.

2. Atherectomy of the right SFA using the orbital atherectomy device from [facility] and

    using 1.5 mm ronnie.

3. Intravascular ultrasound (IVUS) of the right SFA.

4. Balloon angioplasty of the right SFA.

5. Right lower extremity angiogram.

6. Left common femoral artery angiogram.



INDICATION:

This is a 64-year-old gentleman with a significant history of smoking who was

experiencing bilateral lower extremity intermittent claudication and underwent an

abdominal aortogram and bilateral lower extremity runoff. That revealed chronic total

occlusion of the right SFA.  He was brought today to undergo an intervention.



APPROACH:

Left common femoral artery.



COMPLICATIONS:

None.



LEVEL OF SEDATION:

Moderate, with sedation length of 111 minutes.



PROCEDURE DESCRIPTION:

After obtaining informed consent, the patient was brought to the cardiac cath lab.  The

left common femoral artery was cannulated using micropuncture technique. The

micropuncture wire passed easily. Then I placed a 6-Serbian 70 cm sheath.



At that point, anticoagulation was initiated using heparin and the patient was given

8000 units of heparin IV with continuous ACT monitoring throughout the procedure.



After that I did select the right SFA using an 0.035 stiff Glidewire with 5-Serbian RIM

catheter.  Subsequently I did advance the RIM catheter over the 0.035 wire.  Then I

advanced the sheath over the catheter and the wire.  The tip of the sheath was

positioned at the level of the right common femoral artery.



At that point I did right lower extremity angiogram which revealed chronic total

occlusion of the right SFA with mild disease involving the right popliteal and severe

below-the-knee disease.



I crossed the  of the right SFA using an 0.018 gallardo-tipped glidewire with the

backup support of 0.018 CXI catheter.  After that I did exchange my wire for an 0.014

wire, which was a ViperWire.  I did intravascular ultrasound to prove that I was in the

true lumen.  Subsequently I did atherectomy of the right SFA using the orbital

atherectomy device and using 1.5 mm ronnie.  Then I did balloon angioplasty using a 5

x 120 mm balloon.  The proximal, mid and distal right SFA was inflated under 8

atmospheres for one minute each time.  The following angiogram showed adequate

angiographic results in the proximal portion but dissection involving the mid and

distal portions of the right SFA.  Because of that, I decided to cover that with a

stent.  Distally I placed a 7.0 x 140 mm Zilver PTX drug-coated stent and in the mid

another 7.0 x 140 mm Zilver PTX drug-coated stent.  Both stents were deployed under

fluoroscopic guidance and post-dilated using a 6 mm balloon.  The following angiogram

showed good angiographic results.  For the very proximal right SFA, I did PtA using a 6

x 40 mm Inpact drug-coated balloon where the balloon was inflated under fluoroscopic

guidance and inflated for about 3 minutes.  The following angiogram showed adequate

angiographic results with residual stenosis between 40% and 50%.



Finally I did a completion picture below the knee.



After that I exchanged my long sheath for a short sheath using 0.035 stiff Glidewire.

Then I did selective left common femoral artery angiogram.  The procedure was completed

without any complication.



POST-PROCEDURE MANAGEMENT:

1. Dual anti-platelet therapy.

2. Risk factor modifications.

3. Follow up with the patient.





MMODL / IJN: [id number] / Job#: [id number]

## 2020-07-01 NOTE — IR
EXAMINATION TYPE: IR stent intravas non coronary

 

DATE OF EXAM: 7/1/2020

 

COMPARISON: NONE

 

HISTORY: Fluoroscopy time.

 

Fluoroscopy was provided to the referring clinician.

## 2020-07-02 VITALS — TEMPERATURE: 97.6 F | HEART RATE: 60 BPM | DIASTOLIC BLOOD PRESSURE: 44 MMHG | SYSTOLIC BLOOD PRESSURE: 105 MMHG

## 2020-07-02 LAB
ANION GAP SERPL CALC-SCNC: 4 MMOL/L
BASOPHILS # BLD AUTO: 0 K/UL (ref 0–0.2)
BASOPHILS NFR BLD AUTO: 1 %
BUN SERPL-SCNC: 30 MG/DL (ref 9–20)
CALCIUM SPEC-MCNC: 9.2 MG/DL (ref 8.4–10.2)
CHLORIDE SERPL-SCNC: 105 MMOL/L (ref 98–107)
CO2 SERPL-SCNC: 30 MMOL/L (ref 22–30)
EOSINOPHIL # BLD AUTO: 0.1 K/UL (ref 0–0.7)
EOSINOPHIL NFR BLD AUTO: 1 %
ERYTHROCYTE [DISTWIDTH] IN BLOOD BY AUTOMATED COUNT: 4.51 M/UL (ref 4.3–5.9)
ERYTHROCYTE [DISTWIDTH] IN BLOOD: 12.4 % (ref 11.5–15.5)
GLUCOSE BLD-MCNC: 137 MG/DL (ref 75–99)
GLUCOSE SERPL-MCNC: 141 MG/DL (ref 74–99)
HBA1C MFR BLD: 9.8 % (ref 4–6)
HCT VFR BLD AUTO: 43.7 % (ref 39–53)
HGB BLD-MCNC: 14 GM/DL (ref 13–17.5)
LYMPHOCYTES # SPEC AUTO: 0.8 K/UL (ref 1–4.8)
LYMPHOCYTES NFR SPEC AUTO: 11 %
MCH RBC QN AUTO: 31 PG (ref 25–35)
MCHC RBC AUTO-ENTMCNC: 32 G/DL (ref 31–37)
MCV RBC AUTO: 96.9 FL (ref 80–100)
MONOCYTES # BLD AUTO: 0.5 K/UL (ref 0–1)
MONOCYTES NFR BLD AUTO: 7 %
NEUTROPHILS # BLD AUTO: 5.8 K/UL (ref 1.3–7.7)
NEUTROPHILS NFR BLD AUTO: 78 %
PLATELET # BLD AUTO: 142 K/UL (ref 150–450)
POTASSIUM SERPL-SCNC: 5.1 MMOL/L (ref 3.5–5.1)
SODIUM SERPL-SCNC: 139 MMOL/L (ref 137–145)
WBC # BLD AUTO: 7.4 K/UL (ref 3.8–10.6)

## 2020-07-02 RX ADMIN — HYDROMORPHONE HYDROCHLORIDE PRN MG: 1 INJECTION, SOLUTION INTRAMUSCULAR; INTRAVENOUS; SUBCUTANEOUS at 09:45

## 2020-07-02 RX ADMIN — INSULIN ASPART SCH: 100 INJECTION, SOLUTION INTRAVENOUS; SUBCUTANEOUS at 06:30

## 2020-07-02 RX ADMIN — HYDROMORPHONE HYDROCHLORIDE PRN MG: 1 INJECTION, SOLUTION INTRAMUSCULAR; INTRAVENOUS; SUBCUTANEOUS at 01:20

## 2020-07-02 RX ADMIN — HYDROMORPHONE HYDROCHLORIDE PRN MG: 1 INJECTION, SOLUTION INTRAMUSCULAR; INTRAVENOUS; SUBCUTANEOUS at 05:14

## 2020-07-02 NOTE — DS
DISCHARGE SUMMARY



DATE OF ADMISSION:

07/01/2020



DATE OF DISCHARGE:

07/02/2020



BRIEF HISTORY:

This is a 64-year-old gentleman who underwent yesterday successful crossing of chronic

total occlusion of the right SFA along with successful angioplasty and stenting.



He was seen this morning.  The left groin is soft and nontender and without any

bruises.



The patient is going to be discharged home on anticoagulation and statin and I will

follow up with the patient next week in the office.





MMALYSSA / DANIELN: [id number] / Job#: [id number]

## 2020-07-29 ENCOUNTER — HOSPITAL ENCOUNTER (OUTPATIENT)
Dept: HOSPITAL 47 - CATHCVL | Age: 65
LOS: 1 days | Discharge: HOME | End: 2020-07-30
Attending: INTERNAL MEDICINE
Payer: MEDICARE

## 2020-07-29 VITALS — BODY MASS INDEX: 30 KG/M2

## 2020-07-29 DIAGNOSIS — E78.5: ICD-10-CM

## 2020-07-29 DIAGNOSIS — Z79.01: ICD-10-CM

## 2020-07-29 DIAGNOSIS — Z79.4: ICD-10-CM

## 2020-07-29 DIAGNOSIS — Z79.899: ICD-10-CM

## 2020-07-29 DIAGNOSIS — I10: ICD-10-CM

## 2020-07-29 DIAGNOSIS — I70.212: ICD-10-CM

## 2020-07-29 DIAGNOSIS — E11.51: Primary | ICD-10-CM

## 2020-07-29 DIAGNOSIS — G47.30: ICD-10-CM

## 2020-07-29 DIAGNOSIS — I25.10: ICD-10-CM

## 2020-07-29 DIAGNOSIS — Z87.442: ICD-10-CM

## 2020-07-29 DIAGNOSIS — Z79.82: ICD-10-CM

## 2020-07-29 DIAGNOSIS — I48.20: ICD-10-CM

## 2020-07-29 DIAGNOSIS — J44.9: ICD-10-CM

## 2020-07-29 LAB
GLUCOSE BLD-MCNC: 162 MG/DL (ref 75–99)
GLUCOSE BLD-MCNC: 170 MG/DL (ref 75–99)
GLUCOSE BLD-MCNC: 207 MG/DL (ref 75–99)
GLUCOSE BLD-MCNC: 85 MG/DL (ref 75–99)

## 2020-07-29 PROCEDURE — 85025 COMPLETE CBC W/AUTO DIFF WBC: CPT

## 2020-07-29 PROCEDURE — 80048 BASIC METABOLIC PNL TOTAL CA: CPT

## 2020-07-29 PROCEDURE — 37252 INTRVASC US NONCORONARY 1ST: CPT

## 2020-07-29 PROCEDURE — 93971 EXTREMITY STUDY: CPT

## 2020-07-29 PROCEDURE — 37225: CPT

## 2020-07-29 RX ADMIN — INSULIN ASPART SCH: 100 INJECTION, SOLUTION INTRAVENOUS; SUBCUTANEOUS at 20:16

## 2020-07-29 RX ADMIN — INSULIN ASPART SCH: 100 INJECTION, SOLUTION INTRAVENOUS; SUBCUTANEOUS at 20:17

## 2020-07-29 RX ADMIN — APIXABAN SCH MG: 5 TABLET, FILM COATED ORAL at 20:55

## 2020-07-29 RX ADMIN — CEFAZOLIN SCH MLS/HR: 330 INJECTION, POWDER, FOR SOLUTION INTRAMUSCULAR; INTRAVENOUS at 22:47

## 2020-07-29 RX ADMIN — INSULIN ASPART SCH: 100 INJECTION, SOLUTION INTRAVENOUS; SUBCUTANEOUS at 22:32

## 2020-07-29 RX ADMIN — HEPARIN SODIUM ONE UNIT: 1000 INJECTION INTRAVENOUS; SUBCUTANEOUS at 12:22

## 2020-07-29 RX ADMIN — CEFAZOLIN SCH: 330 INJECTION, POWDER, FOR SOLUTION INTRAMUSCULAR; INTRAVENOUS at 20:56

## 2020-07-29 RX ADMIN — HEPARIN SODIUM ONE UNIT: 1000 INJECTION INTRAVENOUS; SUBCUTANEOUS at 12:57

## 2020-07-29 NOTE — IR
Fluoroscopy

 

HISTORY: Peripheral vascular disease pain in left leg

 

23.8 minutes fluoroscopy time supplied to the referring clinician.  601 intraoperative C-arm images d
ocument the procedure. See dictated report from cardiology.

## 2020-07-29 NOTE — PCN
PROCEDURE NOTE



PERCUTANEOUS PERIPHERAL INTERVENTION:



DATE OF SERVICE:

07/29/2020



PERFORMING PHYSICIAN:

Jesus Gray M.D.



PROCEDURES PERFORMED:

1. Atherectomy of the left SFA using the orbital atherectomy device from [facility].

2. Intravascular ultrasound (IVUS) of the left SFA.

3. Successful balloon angioplasty of the left SFA using a 5 x 80 mm Inpact drug-coated

    balloon with excellent angiographic results.

4. Left lower extremity angiogram.

5. Selective right common femoral artery angiogram.



INDICATION:

This is a 64-year-old gentleman with a history of smoking as well as diabetes,

hypertension and dyslipidemia who was experiencing symptoms of bilateral lower

extremity intermittent claudication and underwent an angiogram recently that revealed

occluded bilateral SFA.  He underwent PTA of the right SFA and was brought today to

undergo PTA of the left SFA.



APPROACH:

Right common femoral artery.



COMPLICATIONS:

None.



LEVEL OF SEDATION:

Moderate, with sedation length of 64 minutes.



PROCEDURE DESCRIPTION:

After obtaining informed consent, the patient was brought to the cardiac cath lab.  The

right common femoral artery was cannulated using micropuncture technique. The

micropuncture wire passed easily. Then I placed a 70 cm 6-Wallisian sheath at the right

common femoral artery.



At that point anticoagulation was initiated using heparin.



I did select the left SFA using an 0.035 Stiff Glidewire with the backup support of a 5-

Wallisian RIM catheter.  After that I advanced the 70 cm sheath over the RIM and Stiff

Glidewire to the proximal left SFA.



Left lower extremity angiogram was performed after that and revealed 2-vessel runoff

below the knee with peroneal and AT _____extremely calcified and occluded SFA in the

distal portion just proximal to the Donald canal.



I did cross the chronic total occlusion of the left SFA using an 0.018 gallardo-tipped

Glidewire with the backup support of 5-Wallisian CXI catheter.



After that I did IVUS of the left SFA using 0.014 wire, and that revealed that I was in

the true lumen all the time.  Atherectomy was achieved using the orbital atherectomy

device and using a 1.5 mm ronnie.  Subsequently I did successful balloon angioplasty

of the left SFA using initially a 5 mm chocolate balloon and then a 5 mm drug-coated

balloon.  The final angiogram showed excellent angiographic results and the procedure

was completed without any complication.



POST-PROCEDURE MANAGEMENT:

1. Dual anti-platelet therapy.

2. Risk factor modifications.

3. Follow up with the patient.





MMJAYAL / IJN: [id number] / Job#: [id number]

## 2020-07-30 VITALS — HEART RATE: 53 BPM | SYSTOLIC BLOOD PRESSURE: 113 MMHG | TEMPERATURE: 97.7 F | DIASTOLIC BLOOD PRESSURE: 67 MMHG

## 2020-07-30 VITALS — RESPIRATION RATE: 16 BRPM

## 2020-07-30 LAB
ANION GAP SERPL CALC-SCNC: 5 MMOL/L
BASOPHILS # BLD AUTO: 0 K/UL (ref 0–0.2)
BASOPHILS NFR BLD AUTO: 1 %
BUN SERPL-SCNC: 31 MG/DL (ref 9–20)
CALCIUM SPEC-MCNC: 8.1 MG/DL (ref 8.4–10.2)
CHLORIDE SERPL-SCNC: 112 MMOL/L (ref 98–107)
CO2 SERPL-SCNC: 23 MMOL/L (ref 22–30)
EOSINOPHIL # BLD AUTO: 0.2 K/UL (ref 0–0.7)
EOSINOPHIL NFR BLD AUTO: 4 %
ERYTHROCYTE [DISTWIDTH] IN BLOOD BY AUTOMATED COUNT: 3.53 M/UL (ref 4.3–5.9)
ERYTHROCYTE [DISTWIDTH] IN BLOOD: 13 % (ref 11.5–15.5)
GLUCOSE BLD-MCNC: 130 MG/DL (ref 75–99)
GLUCOSE SERPL-MCNC: 147 MG/DL (ref 74–99)
HCT VFR BLD AUTO: 33.9 % (ref 39–53)
HGB BLD-MCNC: 11 GM/DL (ref 13–17.5)
LYMPHOCYTES # SPEC AUTO: 0.7 K/UL (ref 1–4.8)
LYMPHOCYTES NFR SPEC AUTO: 16 %
MCH RBC QN AUTO: 31.1 PG (ref 25–35)
MCHC RBC AUTO-ENTMCNC: 32.4 G/DL (ref 31–37)
MCV RBC AUTO: 96.1 FL (ref 80–100)
MONOCYTES # BLD AUTO: 0.3 K/UL (ref 0–1)
MONOCYTES NFR BLD AUTO: 7 %
NEUTROPHILS # BLD AUTO: 3.3 K/UL (ref 1.3–7.7)
NEUTROPHILS NFR BLD AUTO: 71 %
PLATELET # BLD AUTO: 146 K/UL (ref 150–450)
POTASSIUM SERPL-SCNC: 4.4 MMOL/L (ref 3.5–5.1)
SODIUM SERPL-SCNC: 140 MMOL/L (ref 137–145)
WBC # BLD AUTO: 4.7 K/UL (ref 3.8–10.6)

## 2020-07-30 RX ADMIN — APIXABAN SCH MG: 5 TABLET, FILM COATED ORAL at 08:06

## 2020-07-30 RX ADMIN — CEFAZOLIN SCH MLS/HR: 330 INJECTION, POWDER, FOR SOLUTION INTRAMUSCULAR; INTRAVENOUS at 03:30

## 2020-07-30 RX ADMIN — INSULIN ASPART SCH: 100 INJECTION, SOLUTION INTRAVENOUS; SUBCUTANEOUS at 08:00

## 2020-07-30 NOTE — US
EXAMINATION TYPE: US venous doppler duplex LE LT

 

DATE OF EXAM: 7/30/2020 9:17 AM

 

COMPARISON: NONE

 

CLINICAL HISTORY: r/o dvt. Left leg pain x 1 day, patient on blood thinners

 

SIDE PERFORMED: Left  

 

TECHNIQUE:  The lower extremity deep venous system is examined utilizing real time linear array sonog
igor with graded compression, doppler sonography and color-flow sonography.

 

VESSELS IMAGED:

External Iliac Vein (EIV)

Common Femoral Vein

Deep Femoral Vein

Greater Saphenous Vein *

Femoral Vein

Popliteal Vein

Small Saphenous Vein *

Proximal Calf Veins

(* superficial vessels)

 

There is normal flow, compressibility, vascular waveforms.

 

Left Leg:  Appears negative for DVT

 

 

 

IMPRESSION: No evident deep venous thrombosis at or above the left knee

## 2020-07-30 NOTE — CONS
CONSULTATION



CHIEF COMPLAINT:

PVOD.



HISTORY OF PRESENT ILLNESS:

This gentleman was brought in for an elective procedure on his left lower extremity in

an effort to restore circulation.  He has a long-standing history of poorly controlled

insulin-dependent diabetes mellitus with poor compliance.  He also has history of

hypertension and he continues to smoke heavily.



REVIEW OF SYSTEMS:

He has had no recent headaches, change in vision or hearing, neurologic deficits,

shortness of breath, cough, hemoptysis, chest pain, orthopnea, PND, abdominal pain,

nausea, vomiting, hematemesis, melena, hematochezia, colitis, diverticulosis,

diverticulitis, hemorrhoids, jaundice, hepatitis, cirrhosis, renal failure, dysuria,

frequency, urgency, incontinence, nocturia, etc.



Past medical history, family history, and personal and social histories can be found in

his admitting summary.  He has a history of atrial fibrillation.  He cannot take

Coumadin.  It apparently caused hallucinations.



Medications include:

1. Fenofibrate 145 mg once a day.

2. Losartan 25 once a day.

3. Eliquis 5 mg b.i.d.

4. Aspirin 325 mg once a day.

5. Trulicity 1.5 mg per 0.5 mL once a week.

6. Basaglar 80 units once a day.

7. Jardiance 25 mg once a day.

8. Simvastatin 80 once a day.

9. Niacin 500 mg once a day.

10.Lanoxin 0.125 once a day.

11.Vitamin D.

Other historical details include history of renal calculi, right _____ attachment on

the right in 2014, sleep apnea, plantar fasciitis.



PHYSICAL EXAMINATION:

Blood pressure is 116/78, pulse 76, respirations of 20 and he is afebrile. In general

he appears to be well developed, well nourished, in no acute distress.  Skin color is

normal. Skin is warm and dry. Lymph nodes are not enlarged.  Head, ears, eyes, nose,

mouth and throat are normal and neck veins are not distended.  Chest is clear.  Cardiac

exam demonstrates what sounds like sinus rhythm with no murmurs or extra sounds.

Abdomen is soft, nontender without visceromegaly or masses. Bowel sounds are present.

Extremities are normal and pulses are weak in both lower extremities.  Neurologically

he is intact.



He is admitted to the hospital with diagnoses:

1. Peripheral vascular occlusive disease affecting the left lower extremity.

2. Atherosclerotic cardiovascular disease.

3. Chronic obstructive pulmonary disease.

4. Poorly controlled, insulin-dependent diabetes mellitus.



RECOMMENDATIONS:

None.  He is doing well, and I will follow for any medical problems while he is in the

hospital.





LANDRY / EDGARDO: [id number] / Job#: [id number]

## 2020-07-30 NOTE — PN
PROGRESS NOTE



ADMITTING CHIEF COMPLAINT:

Status post vascular procedure regarding the left lower extremity.



HISTORY OF PRESENT ILLNESS:

This gentleman is doing well.  He has had no chest pain, shortness of breath, etc.  He

will probably go home today.



PHYSICAL EXAM:

Head ears, eyes, nose, mouth, and throat are normal.  The chest is clear.  The cardiac

exam is normal.  The abdomen is soft, nontender.  Extremities are normal.



IMPRESSION:

1. Peripheral vascular occlusive disease.

2. Atherosclerotic cardiovascular disease.

3. Insulin-dependent diabetes mellitus.

4. Chronic obstructive pulmonary disease.

5. Hypertension.



PLAN:

Probably home later today and will follow him up in the office.





MMODL / IJN: [id number] / Job#: [id number]

## 2020-07-30 NOTE — P.DS
Providers


Date of admission: 





July 29 of 20-20


Attending physician: 


Jesus Gray





Primary care physician: 


Kj Mahmoodhven





[facility] Course: 





This is a 64-year-old gentleman who underwent yesterday successful crossing 

chronic total occlusion of the left SFA along with successful angioplasty of the

left SFA with adjunctive use of atherectomy.  He was seen this morning.  The 

right groin is soft and nontender and without any bruises.  He does have left 

pelvic tenderness and I'm going to obtain a venous duplex study to rule out DVT.

 Otherwise and from a cardiovascular standpoint overview, he can be discharged 

home.





Plan - Discharge Summary


Discharge Rx Participant: No


New Discharge Prescriptions: 


Continue


   Ergocalciferol [Vitamin D2 (DRISDOL)] 50,000 unit PO Q30D


   Digoxin [Digitek] 125 mcg PO DAILY


   Losartan [Cozaar] 25 mg PO DAILY #30 tab


   Fenofibrate Nanocrystallized [Fenofibrate] 145 mg PO DAILY


   Ubidecarenone [Co Q-10] 200 mg PO DAILY


   Simvastatin [Zocor] 80 mg PO HS


   Niacin 500 mg PO DAILY


   Insulin Glulisine [Apidra] 6 - 10 unit SQ TID PRN


     PRN Reason: Blood Sugar - High


   Insulin Glargine,Hum.rec.anlog [Basaglar Kwikpen U-100] 80 unit SQ QAM


   Empagliflozin [Jardiance] 25 mg PO DAILY


   Dulaglutide [Trulicity] 1.5 mg SQ MO


   Apixaban [Eliquis] 5 mg PO BID


   Aspirin 325 mg PO DAILY


Discharge Medication List





Digoxin [Digitek] 125 mcg PO DAILY 03/19/17 [History]


Ergocalciferol [Vitamin D2 (DRISDOL)] 50,000 unit PO Q30D 03/19/17 [History]


Losartan [Cozaar] 25 mg PO DAILY #30 tab 05/25/17 [Rx]


Fenofibrate Nanocrystallized [Fenofibrate] 145 mg PO DAILY 02/01/18 [History]


Dulaglutide [Trulicity] 1.5 mg SQ MO 05/21/20 [History]


Empagliflozin [Jardiance] 25 mg PO DAILY 05/21/20 [History]


Insulin Glargine,Hum.rec.anlog [Basaglar Kwikpen U-100] 80 unit SQ QAM 05/21/20 

[History]


Insulin Glulisine [Apidra] 6 - 10 unit SQ TID PRN 05/21/20 [History]


Niacin 500 mg PO DAILY 05/21/20 [History]


Simvastatin [Zocor] 80 mg PO HS 05/21/20 [History]


Ubidecarenone [Co Q-10] 200 mg PO DAILY 05/21/20 [History]


Apixaban [Eliquis] 5 mg PO BID 07/27/20 [History]


Aspirin 325 mg PO DAILY 07/27/20 [History]








Follow up Appointment(s)/Referral(s): 


Jesus Gray MD [STAFF PHYSICIAN] - 1 Week

## 2020-10-26 ENCOUNTER — HOSPITAL ENCOUNTER (OUTPATIENT)
Dept: HOSPITAL 47 - LABWHC1 | Age: 65
Discharge: HOME | End: 2020-10-26
Attending: FAMILY MEDICINE
Payer: MEDICARE

## 2020-10-26 DIAGNOSIS — Z20.828: Primary | ICD-10-CM

## 2020-11-19 ENCOUNTER — HOSPITAL ENCOUNTER (OUTPATIENT)
Dept: HOSPITAL 47 - LABWHC1 | Age: 65
Discharge: HOME | End: 2020-11-19
Attending: FAMILY MEDICINE
Payer: MEDICARE

## 2020-11-19 DIAGNOSIS — Z20.828: Primary | ICD-10-CM

## 2021-09-08 ENCOUNTER — HOSPITAL ENCOUNTER (OUTPATIENT)
Dept: HOSPITAL 47 - ORWHC2ENDO | Age: 66
Discharge: HOME | End: 2021-09-08
Attending: INTERNAL MEDICINE
Payer: MEDICARE

## 2021-09-08 VITALS — TEMPERATURE: 98 F

## 2021-09-08 VITALS — DIASTOLIC BLOOD PRESSURE: 52 MMHG | SYSTOLIC BLOOD PRESSURE: 112 MMHG | HEART RATE: 70 BPM

## 2021-09-08 VITALS — RESPIRATION RATE: 16 BRPM

## 2021-09-08 VITALS — BODY MASS INDEX: 29.7 KG/M2

## 2021-09-08 DIAGNOSIS — Z79.01: ICD-10-CM

## 2021-09-08 DIAGNOSIS — Z86.010: ICD-10-CM

## 2021-09-08 DIAGNOSIS — I48.91: ICD-10-CM

## 2021-09-08 DIAGNOSIS — F41.9: ICD-10-CM

## 2021-09-08 DIAGNOSIS — I25.10: ICD-10-CM

## 2021-09-08 DIAGNOSIS — G47.33: ICD-10-CM

## 2021-09-08 DIAGNOSIS — F17.210: ICD-10-CM

## 2021-09-08 DIAGNOSIS — F32.9: ICD-10-CM

## 2021-09-08 DIAGNOSIS — D12.4: ICD-10-CM

## 2021-09-08 DIAGNOSIS — Z12.11: Primary | ICD-10-CM

## 2021-09-08 DIAGNOSIS — E11.9: ICD-10-CM

## 2021-09-08 DIAGNOSIS — Z79.899: ICD-10-CM

## 2021-09-08 DIAGNOSIS — K57.90: ICD-10-CM

## 2021-09-08 LAB
GLUCOSE BLD-MCNC: 107 MG/DL (ref 75–99)
GLUCOSE BLD-MCNC: 107 MG/DL (ref 75–99)

## 2021-09-08 PROCEDURE — 88305 TISSUE EXAM BY PATHOLOGIST: CPT

## 2021-09-08 PROCEDURE — 45385 COLONOSCOPY W/LESION REMOVAL: CPT

## 2021-09-08 NOTE — P.PCN
Date of Procedure: 09/08/21


Procedure(s) Performed: 


BRIEF HISTORY: Patient is a 65-year-old pleasant white male scheduled for an 

elective colonoscopy as a part of evaluation of prior history of colon polyps.  

Last coloscopy was 5 years ago.





PROCEDURE PERFORMED: Colonoscopy with snare polypectomy. 





PREOPERATIVE DIAGNOSIS: History of colon polyps. 





IV sedation per Anesthesia. 





PROCEDURE: After informed consent was obtained, the patient, was brought into 

the endoscopy unit. IV sedation was administered by Anesthesia under continuous 

monitoring.  Digital rectal examination was normal. Initially the Olympus CF-160

flexible video colonoscope was then inserted in the rectum, gradually advanced 

into the cecum without any difficulty. Careful examination was performed as the 

scope was gradually being withdrawn. Ileocecal valve and the appendiceal orifice

were visualized and appeared normal.  Prep was poor and several areas of the 

colon.  Mucosa of the cecum, ascending colon appeared normal.  In the hepatic 

flexure there was a 1 cm polyp removed by snare polypectomy.  In the transverse 

colon there was a 5 limited polyp removed by snare polypectomy.  Rest of the, 

transverse colon, descending colon, sigmoid colon, and rectum appeared normal. 

Retroflexion was performed in the rectum and no lesions were seen. The patient 

tolerated the procedure well. 





IMPRESSION: 


1 cm hepatic flexure polyp status post polypectomy


7 mm transverse colon polyp status post polypectomy


Scattered sigmoid diverticulosis





RECOMMENDATIONS:  Findings of this examination were discussed with the patient 

as well as a family.  He was advised to follow with the biopsy the biopsy report

adenoma he can have a repeat colonoscopy in 3 years.

## 2021-10-13 ENCOUNTER — HOSPITAL ENCOUNTER (OUTPATIENT)
Dept: HOSPITAL 47 - RADUSWWP | Age: 66
Discharge: HOME | End: 2021-10-13
Attending: FAMILY MEDICINE
Payer: MEDICARE

## 2021-10-13 DIAGNOSIS — N28.1: ICD-10-CM

## 2021-10-13 DIAGNOSIS — N18.4: Primary | ICD-10-CM

## 2021-10-13 PROCEDURE — 76770 US EXAM ABDO BACK WALL COMP: CPT

## 2021-10-13 NOTE — US
EXAMINATION TYPE: US kidneys/renal and bladder

 

DATE OF EXAM: 10/13/2021

 

COMPARISON: CT

 

CLINICAL HISTORY: N18.4 CKD STAGE 4. CKD.

 

EXAM MEASUREMENTS:

 

Right Kidney:  11.4 x 5.6 x 5.4 cm

Left Kidney: 10.2 x 4.9 x 6.2 cm

 

 

Right Kidney: Anechoic area seen lower pole: 2.3 x 2.3 x 2.4 cm.  Anechoic area seen upper pole: 1.5 
x 1.4 x 1.6 cm.

Left Kidney: Anechoic area seen medially: 3.3 x 2.7 x 2.8 cm.  

Bladder: Appears anechoic.

**Bilateral Jets seen: Yes

 

 

IMPRESSION:

No hydronephrosis or nephrolithiasis. Bilateral simple appearing renal cysts.

## 2022-04-19 ENCOUNTER — HOSPITAL ENCOUNTER (OUTPATIENT)
Dept: HOSPITAL 47 - RADMRIMAIN | Age: 67
Discharge: HOME | End: 2022-04-19
Attending: ORTHOPAEDIC SURGERY
Payer: MEDICARE

## 2022-04-19 DIAGNOSIS — M25.711: ICD-10-CM

## 2022-04-19 DIAGNOSIS — M25.811: ICD-10-CM

## 2022-04-19 DIAGNOSIS — M75.101: ICD-10-CM

## 2022-04-19 DIAGNOSIS — M19.011: Primary | ICD-10-CM

## 2022-04-20 NOTE — MR
EXAMINATION TYPE: MR shoulder RT wo con

 

DATE OF EXAM: 4/19/2022

 

COMPARISON: None

 

HISTORY: Rt shoulder pain, S/P fall.

 

Multiplanar multi echo imaging of the right shoulder without contrast.

 

There is a large shoulder joint effusion. Subscapularis tendon appears intact. The biceps tendon is i
ntact.

 

There is large defect in the supraspinatus tendon over the superior aspect of the humeral head. There
 is some retraction. There is moderate hypertrophic spurring at the AC joint and significant subacrom
ial impingement on the supraspinatus muscle and tendon. The glenoid clem appear intact. There is no 
evidence of a fracture. The scapula appears intact.

 

IMPRESSION:

Large shoulder joint effusion. Large rotator cuff tear with retraction of the supraspinatus tendon. E
xtensive spurring at the AC joint with severe subacromial impingement. There is some mild osteoarthri
tic narrowing of the glenohumeral joint space.

## 2024-08-06 ENCOUNTER — HOSPITAL ENCOUNTER (OUTPATIENT)
Dept: HOSPITAL 47 - LABPRL | Age: 69
Discharge: HOME | End: 2024-08-06
Attending: NURSE PRACTITIONER
Payer: MEDICARE

## 2024-08-06 DIAGNOSIS — E78.5: ICD-10-CM

## 2024-08-06 DIAGNOSIS — Z00.00: Primary | ICD-10-CM

## 2024-08-06 DIAGNOSIS — R53.83: ICD-10-CM

## 2024-08-06 DIAGNOSIS — Z13.220: ICD-10-CM

## 2024-08-06 DIAGNOSIS — N40.0: ICD-10-CM

## 2024-08-06 DIAGNOSIS — E11.9: ICD-10-CM

## 2024-08-06 DIAGNOSIS — E55.9: ICD-10-CM

## 2024-08-06 PROCEDURE — 84439 ASSAY OF FREE THYROXINE: CPT

## 2024-08-06 PROCEDURE — 80061 LIPID PANEL: CPT

## 2024-08-06 PROCEDURE — 82306 VITAMIN D 25 HYDROXY: CPT

## 2024-08-06 PROCEDURE — 84550 ASSAY OF BLOOD/URIC ACID: CPT

## 2024-08-06 PROCEDURE — 85025 COMPLETE CBC W/AUTO DIFF WBC: CPT

## 2024-08-06 PROCEDURE — 84443 ASSAY THYROID STIM HORMONE: CPT

## 2024-08-06 PROCEDURE — 82570 ASSAY OF URINE CREATININE: CPT

## 2024-08-06 PROCEDURE — 80053 COMPREHEN METABOLIC PANEL: CPT

## 2024-08-06 PROCEDURE — 82043 UR ALBUMIN QUANTITATIVE: CPT

## 2024-08-06 PROCEDURE — 84153 ASSAY OF PSA TOTAL: CPT

## 2024-09-23 ENCOUNTER — HOSPITAL ENCOUNTER (OUTPATIENT)
Dept: HOSPITAL 47 - RADUSWWP | Age: 69
Discharge: HOME | End: 2024-09-23
Attending: INTERNAL MEDICINE
Payer: MEDICARE

## 2024-09-23 PROCEDURE — 76770 US EXAM ABDO BACK WALL COMP: CPT

## 2024-09-23 NOTE — US
EXAMINATION TYPE: US kidneys/renal and bladder

 

DATE OF EXAM: 9/23/2024

 

COMPARISON:

 

CLINICAL INDICATION: Male, 68 years old with history of N18.32 CHRONIC KIDNEY DISEASE, STAGE 3B; Hx r
enal cysts

 

EXAM MEASUREMENTS:

 

Right Kidney:  10.9 x 4.7 x 5.8 cm

Left Kidney: 11.2 x 4.6 x 6.0 cm

 

 

 

Right Kidney: Lower medial anechoic lesion = 2.7 x 2.5 x 2.7 cm.  Mid mid anechoic lesion = 1.4 x 1.5
 x 1.2 cm  

Left Kidney: Upper medial anechoic lesion = 3.5 x 2.8 x 2.9 cm   

Bladder: anechoic, distended

**Bilateral Jets seen

 

 

There is no evidence for hydronephrosis at this point in time.  No nephrolithiasis is seen.  The urin
nate bladder is anechoic.  Bilateral ureteral jets are seen.

 

 

 

IMPRESSION:

Renal cystic changes seen.

 

 

 

[facility], Workstation: [id number], 9/23/2024 6:15 PM

## 2024-09-28 ENCOUNTER — HOSPITAL ENCOUNTER (EMERGENCY)
Dept: HOSPITAL 47 - EC | Age: 69
Discharge: HOME | End: 2024-09-28
Payer: MEDICARE

## 2024-09-28 VITALS
DIASTOLIC BLOOD PRESSURE: 63 MMHG | TEMPERATURE: 98 F | HEART RATE: 76 BPM | RESPIRATION RATE: 18 BRPM | SYSTOLIC BLOOD PRESSURE: 127 MMHG

## 2024-09-28 PROCEDURE — 99283 EMERGENCY DEPT VISIT LOW MDM: CPT

## 2024-09-28 RX ADMIN — KETOROLAC TROMETHAMINE STA DROPS: 5 SOLUTION OPHTHALMIC at 12:12

## 2024-09-28 RX ADMIN — FLUORESCEIN SODIUM ONE MG: 1 STRIP OPHTHALMIC at 11:30

## 2024-09-28 RX ADMIN — PROPARACAINE HYDROCHLORIDE STA DROPS: 5 SOLUTION/ DROPS OPHTHALMIC at 11:30

## 2024-09-28 NOTE — ED
Eye Problem HPI





- General


Chief complaint: Eye Problems


Stated complaint: Left eye irritation


Time Seen by Provider: 24 10:58


Source: patient, family, RN notes reviewed


Mode of arrival: ambulatory


Limitations: no limitations





- History of Present Illness


Initial comments: 


This is a 68-year-old male who presents to the emergency department for left eye

pain.  States that when he woke up his left eye was somewhat painful and had 

clear drainage. States that his vision also felt somewhat blurry.  He was 

working in his shed yesterday and wonders if he may have gotten something in it.

 He saw his PCP who put him on antibiotic eyedrops, which he has not yet picked 

up.  He presents here to see if we can examine the eye as well and do any 

additional tests.





MD chief complaint: eye pain





- Related Data


                                Home Medications











 Medication  Instructions  Recorded  Confirmed


 


Digoxin [Digitek] 125 mcg PO DAILY 17


 


Ergocalciferol [Vitamin D2 50,000 unit PO Q30D 17





(DRISDOL)]   


 


Fenofibrate Nanocrystallized 145 mg PO DAILY 18





[Fenofibrate]   


 


Dulaglutide [Trulicity] 1.5 mg SQ MO 20


 


Empagliflozin [Jardiance] 25 mg PO DAILY 20


 


Insulin Glargine,Hum.rec.anlog 80 unit SQ QAM 20





[Basaglar Kwikpen U-100]   


 


Niacin 500 mg PO DAILY 20


 


Simvastatin [Zocor] 80 mg PO HS 20


 


Ubidecarenone [Co Q-10] 200 mg PO DAILY 20


 


Apixaban [Eliquis] 5 mg PO BID 20


 


Aspirin 325 mg PO DAILY 20


 


INSULIN LISPRO (humaLOG) [humaLOG] 0 units SQ ACHS PRN 21








                                  Previous Rx's











 Medication  Instructions  Recorded


 


Losartan [Cozaar] 25 mg PO DAILY #30 tab 17











                                    Allergies











Allergy/AdvReac Type Severity Reaction Status Date / Time


 


warfarin [From Coumadin] Allergy Unknown MARY, Verified 24 10:54





   NERVOUS,  





   SWEATING  














Review of Systems


ROS Statement: 


Those systems with pertinent positive or pertinent negative responses have been 

documented in the HPI.





ROS Other: All systems not noted in ROS Statement are negative.





Past Medical History


Past Medical History: Atrial Fibrillation, Coronary Artery Disease (CAD), 

Diabetes Mellitus, Renal Disease, Sleep Apnea/CPAP/BIPAP, Vascular Disorder


Additional Past Medical History / Comment(s): " HEART PALPITATIONS SEVERAL YEARS

AGO".  rt knee brace d/t injury


History of Any Multi-Drug Resistant Organisms: MRSA


Date of last positivie culture/infection: 22


MDRO Source:: Left Arm


Past Surgical History: Hernia Repair, Tonsillectomy


Additional Past Surgical History / Comment(s): recent aortogram, pastor leg 

atherectomy/stent 20


Past Anesthesia/Blood Transfusion Reactions: No Reported Reaction


Past Psychological History: Anxiety, Depression


Smoking Status: Current every day smoker


Past Alcohol Use History: None Reported


Past Drug Use History: None Reported





- Past Family History


  ** Mother


Family Medical History: Diabetes Mellitus





  ** Father


Additional Family Medical History / Comment(s): ulcers, "lung aneurysm passed 

from"





General Exam


Limitations: no limitations


General appearance: alert, in no apparent distress


Head exam: Present: atraumatic, normocephalic, normal inspection


Eye exam: Present: PERRL, EOMI, other (Mild left conjunctival injection and 

tearing).  Absent: periorbital swelling, periorbital tenderness


  ** Expanded


Visual acuity (R) = 20/: 50


Visual acuity (L) = 20/: 40


With correction: Yes


IOP (R) in mmH


IOP (L) in mmH


Respiratory exam: Present: normal lung sounds bilaterally.  Absent: respiratory 

distress, wheezes, rales, rhonchi, stridor


Cardiovascular Exam: Present: regular rate, normal rhythm, normal heart sounds. 

Absent: systolic murmur, diastolic murmur, rubs, gallop, clicks


Neurological exam: Present: alert, oriented X3, CN II-XII intact


Psychiatric exam: Present: normal affect, normal mood


Skin exam: Present: warm, dry, intact, normal color.  Absent: rash





Course


                                   Vital Signs











  24





  10:49 12:15


 


Temperature 97.8 F 98.0 F


 


Pulse Rate 57 L 76


 


Respiratory 20 18





Rate  


 


Blood Pressure 114/68 127/63


 


O2 Sat by Pulse 94 L 98





Oximetry  














Medical Decision Making





- Medical Decision Making


This is a 68 year old male who presents to the emergency department for left eye

pain. 





Was pt. sent in by a medical professional or institution?


@  -No   


Did you speak to anyone other than the patient for history?  


@  -No


Did you review nursing and triage notes? 


@  -Yes, and I agree, it is accurate with regards to the patient's symptoms.


Were old charts reviewed? 


@  -No


Differential Diagnosis? 


@  -Differential Eye Pain:


Conjuncitivitis (viral, bacterial, allergic), corneal abrasion, foreign body, 

iritis, uveitis, keratitis, acute angle closure glaucoma, this is not meant to b

e an all-inclusive list.


EKG interpreted by me (3pts min.)?


@  -Not obtained


X-rays interpreted by me (1pt min.)?


@  -Not obtained


CT interpreted by me (1pt min.)?


@  -Not obtained


U/S interpreted by me (1pt. min.)?


@  -Not obtained


What testing was considered but not performed? (CT, X-rays, U/S, labs)? Why?


@  -None


What meds were considered but not given? Why?


@  -None


Did you discuss the management of the patient with other professionals?


@  -No


Did you reconcile home meds?


@  -No


Was smoking cessation discussed for >3mins.?


@  -No


Was critical care preformed (if so, how long)?


@  -No


Were there social determinants of health that impacted care today? How? 

(Homelessness, low income, unemployed, alcoholism, drug addiction, tra

nsportation, low edu. Level, literacy, decrease access to med. care, prison, 

rehab)?


@  -No


Was there de-escalation of care discussed even if they declined? (Discuss DNR or

withdrawal of care, Hospice)?


@  -No


What co-morbidities impacted this encounter? (DM, HTN, Smoking, COPD, CAD, 

Cancer, CVA, Hep., AIDS, mental health diagnosis, sleep apnea, morbid obesity)?


@  -None


Was patient admitted / discharged?


@  -Discharged.  Patient appeared to have a mild conjunctival injection and some

tearing on exam.  Visual acuity was 20/30 bilaterally.  Visual acuity was 

actually better in the affected eye.  It was 20/40 in the left eye and 20/50 in 

the right eye.  IOP's within normal limits at 13 in the left eye and 12 in the 

right eye.  Fluorescein staining revealed no obvious corneal abrasion.  There 

were a couple of hairs in his eye that were removed, but otherwise no foreign 

bodies were evident.  Advised he use the antibiotic eyedrops that were 

prescribed by his primary care provider. He was sent home with ketorolac 

eyedrops to help with any discomfort.  Also advised follow-up with 

ophthalmology.  Case discussed with ED attending Dr. Ladd.





Return precautions reviewed in depth, the patient is instructed to return to the

emergency department with any new, worsening, or concerning symptoms. Patient ve

rbalized understanding.


Undiagnosed new problem with uncertain prognosis?


@  -None


Drug Therapy requiring intensive monitoring for toxicity (Heparin, Nitro, Insul

in, Cardizem)?


@  -None


Were any procedures done?


@  -None


Diagnosis/symptom?


@  -Conjunctivitis


Acute, or Chronic, or Acute on Chronic?


@  -Acute


Uncomplicated (without systemic symptoms) or Complicated (systemic symptoms)?


@  -Uncomplicated


Side effects of treatment?


@  -None


Exacerbation, Progression, or Severe Exacerbation]


@  -Not applicable


Poses a threat to life or bodily function?


@  -No








Disposition


Clinical Impression: 


 Conjunctivitis





Disposition: HOME SELF-CARE


Instructions (If sedation given, give patient instructions):  Conjunctivitis 

(ED)


Additional Instructions: 


Return to the emergency department with any new, worsening, or concerning 

symptoms.  Begin using the antibiotic eyedrops prescribed by your primary care 

provider.  You can use the ketorolac eyedrops provided as 1 drop in the left eye

up to every 6 hours for the next 3 to 5 days to help with discomfort.  Follow-up

with an eye doctor.


Is patient prescribed a controlled substance at d/c from ED?: No


Referrals: 


Kj Lawrence MD [Primary Care Provider] - 1-2 days


Time of Disposition: 11:54

## 2025-07-23 ENCOUNTER — HOSPITAL ENCOUNTER (OUTPATIENT)
Dept: HOSPITAL 47 - CATHCVL | Age: 70
LOS: 1 days | End: 2025-07-24
Attending: INTERNAL MEDICINE
Payer: MEDICARE

## 2025-07-23 DIAGNOSIS — Z79.82: ICD-10-CM

## 2025-07-23 DIAGNOSIS — I70.213: Primary | ICD-10-CM

## 2025-07-23 DIAGNOSIS — Z79.01: ICD-10-CM

## 2025-07-23 DIAGNOSIS — F17.200: ICD-10-CM

## 2025-07-23 DIAGNOSIS — I10: ICD-10-CM

## 2025-07-23 DIAGNOSIS — Z79.4: ICD-10-CM

## 2025-07-23 DIAGNOSIS — E78.5: ICD-10-CM

## 2025-07-23 DIAGNOSIS — E11.51: ICD-10-CM

## 2025-07-23 DIAGNOSIS — I48.91: ICD-10-CM

## 2025-07-23 DIAGNOSIS — I48.20: ICD-10-CM

## 2025-07-23 DIAGNOSIS — Z79.899: ICD-10-CM

## 2025-07-23 DIAGNOSIS — E11.65: ICD-10-CM

## 2025-07-23 DIAGNOSIS — J44.9: ICD-10-CM

## 2025-07-23 DIAGNOSIS — I25.10: ICD-10-CM

## 2025-07-23 LAB
ANION GAP SERPL CALC-SCNC: 10 MMOL/L
ANION GAP SERPL CALC-SCNC: 6 MMOL/L
ANISOCYTOSIS BLD QL SMEAR: (no result)
ANISOCYTOSIS BLD QL SMEAR: (no result)
BASO STIPL BLD QL SMEAR: (no result)
BASO STIPL BLD QL SMEAR: (no result)
BASOPHILS # BLD AUTO: 0.02 10*3/UL (ref 0–0.1)
BASOPHILS # BLD AUTO: 0.03 10*3/UL (ref 0–0.1)
BASOPHILS NFR BLD AUTO: 0.6 %
BASOPHILS NFR BLD AUTO: 0.7 %
BUN SERPL-SCNC: 36 MG/DL (ref 9–20)
BUN SERPL-SCNC: 40 MG/DL (ref 9–20)
BURR CELLS BLD QL SMEAR: (no result)
BURR CELLS BLD QL SMEAR: (no result)
CALCIUM SPEC-MCNC: 9.3 MG/DL (ref 8.4–10.2)
CALCIUM SPEC-MCNC: 9.6 MG/DL (ref 8.4–10.2)
CHLORIDE SERPL-SCNC: 101 MMOL/L (ref 98–107)
CHLORIDE SERPL-SCNC: 106 MMOL/L (ref 98–107)
CO2 SERPL-SCNC: 26 MMOL/L (ref 22–30)
CO2 SERPL-SCNC: 26 MMOL/L (ref 22–30)
CREATININE: 1.47 MG/DL (ref 0.66–1.25)
CREATININE: 1.55 MG/DL (ref 0.66–1.25)
DACRYOCYTES BLD QL SMEAR: (no result)
DACRYOCYTES BLD QL SMEAR: (no result)
DOHLE BOD BLD QL SMEAR: (no result)
DOHLE BOD BLD QL SMEAR: (no result)
EOSINOPHIL # BLD AUTO: 0.11 10*3/UL (ref 0.04–0.35)
EOSINOPHIL # BLD AUTO: 0.14 10*3/UL (ref 0.04–0.35)
EOSINOPHIL NFR BLD AUTO: 2.5 %
EOSINOPHIL NFR BLD AUTO: 3.9 %
ERYTHROCYTE [DISTWIDTH] IN BLOOD BY AUTOMATED COUNT: 3.93 10*6/UL (ref 4.4–5.6)
ERYTHROCYTE [DISTWIDTH] IN BLOOD BY AUTOMATED COUNT: 4.22 10*6/UL (ref 4.4–5.6)
ERYTHROCYTE [DISTWIDTH] IN BLOOD: 12.7 % (ref 11.5–14.5)
ERYTHROCYTE [DISTWIDTH] IN BLOOD: 12.8 % (ref 11.5–14.5)
GLUCOSE BLD-MCNC: 235 MG/DL (ref 70–110)
GLUCOSE SERPL-MCNC: 168 MG/DL (ref 74–99)
GLUCOSE SERPL-MCNC: 236 MG/DL (ref 74–99)
HCT VFR BLD AUTO: 38.2 % (ref 39.6–50)
HCT VFR BLD AUTO: 41 % (ref 39.6–50)
HGB BLD-MCNC: 12.8 G/DL (ref 13–17)
HGB BLD-MCNC: 13.8 G/DL (ref 13–17)
HOWELL-JOLLY BOD BLD QL SMEAR: (no result)
HOWELL-JOLLY BOD BLD QL SMEAR: (no result)
HYPOCHROMIA BLD QL SMEAR: (no result)
HYPOCHROMIA BLD QL SMEAR: (no result)
IMM GRANULOCYTES # BLD: 0.01 10*3/UL (ref 0–0.04)
IMM GRANULOCYTES # BLD: 0.01 10*3/UL (ref 0–0.04)
LG PLATELETS BLD QL SMEAR: (no result)
LG PLATELETS BLD QL SMEAR: (no result)
LYMPHOCYTES # SPEC AUTO: 1.05 10*3/UL (ref 0.9–5)
LYMPHOCYTES # SPEC AUTO: 1.12 10*3/UL (ref 0.9–5)
LYMPHOCYTES NFR SPEC AUTO: 23.8 %
LYMPHOCYTES NFR SPEC AUTO: 30.9 %
Lab: (no result)
Lab: (no result)
MCH RBC QN AUTO: 32.6 PG (ref 27–32)
MCH RBC QN AUTO: 32.7 PG (ref 27–32)
MCHC RBC AUTO-ENTMCNC: 33.5 G/DL (ref 32–37)
MCHC RBC AUTO-ENTMCNC: 33.7 G/DL (ref 32–37)
MCV RBC AUTO: 97.2 FL (ref 80–97)
MCV RBC AUTO: 97.2 FL (ref 80–97)
MONOCYTES # BLD AUTO: 0.47 10*3/UL (ref 0.2–1)
MONOCYTES # BLD AUTO: 0.62 10*3/UL (ref 0.2–1)
MONOCYTES NFR BLD AUTO: 13 %
MONOCYTES NFR BLD AUTO: 14.1 %
NEUTROPHILS # BLD AUTO: 1.86 10*3/UL (ref 1.8–7.7)
NEUTROPHILS # BLD AUTO: 2.59 10*3/UL (ref 1.8–7.7)
NEUTROPHILS NFR BLD AUTO: 51.3 %
NEUTROPHILS NFR BLD AUTO: 58.7 %
NEUTS HYPERSEG # BLD: (no result) 10*3/UL
NEUTS HYPERSEG # BLD: (no result) 10*3/UL
NRBC BLD AUTO-RTO: (no result) %
NRBC BLD AUTO-RTO: (no result) %
OVALOCYTES BLD QL SMEAR: (no result)
OVALOCYTES BLD QL SMEAR: (no result)
PELGER HUET CELLS BLD QL SMEAR: (no result)
PELGER HUET CELLS BLD QL SMEAR: (no result)
PLATELET # BLD AUTO: 139 10*3/UL (ref 140–440)
PLATELET # BLD AUTO: 144 10*3/UL (ref 140–440)
PLATELETS.RETICULATED NFR BLD AUTO: (no result) %
PLATELETS.RETICULATED NFR BLD AUTO: 4.2 % (ref 1.1–6.1)
PMV BLD AUTO: 10.8 FL (ref 9.5–12.2)
PMV BLD AUTO: 10.8 FL (ref 9.5–12.2)
POIKILOCYTOSIS BLD QL SMEAR: (no result)
POLYCHROMASIA BLD QL SMEAR: (no result)
POLYCHROMASIA BLD QL SMEAR: (no result)
POTASSIUM SERPL-SCNC: 4.2 MMOL/L (ref 3.5–5.1)
POTASSIUM SERPL-SCNC: 4.5 MMOL/L (ref 3.5–5.1)
RBC MORPH BLD: (no result)
RBC MORPH BLD: (no result)
ROULEAUX BLD QL SMEAR: (no result)
ROULEAUX BLD QL SMEAR: (no result)
SCHISTOCYTES BLD QL SMEAR: (no result)
SCHISTOCYTES BLD QL SMEAR: (no result)
SICKLE CELLS BLD QL SMEAR: (no result)
SICKLE CELLS BLD QL SMEAR: (no result)
SODIUM SERPL-SCNC: 137 MMOL/L (ref 137–145)
SODIUM SERPL-SCNC: 138 MMOL/L (ref 137–145)
SPHEROCYTES BLD QL SMEAR: (no result)
SPHEROCYTES BLD QL SMEAR: (no result)
STOMATOCYTES BLD QL SMEAR: (no result)
STOMATOCYTES BLD QL SMEAR: (no result)
TARGETS BLD QL SMEAR: (no result)
TARGETS BLD QL SMEAR: (no result)
TOXIC GRANULES BLD QL SMEAR: (no result)
TOXIC GRANULES BLD QL SMEAR: (no result)
VARIANT LYMPHS BLD QL SMEAR: (no result)
VARIANT LYMPHS BLD QL SMEAR: (no result)
WBC # BLD AUTO: 3.62 10*3/UL (ref 4.5–10)
WBC # BLD AUTO: 4.41 10*3/UL (ref 4.5–10)
WBC NRBC COR # BLD: (no result) K/UL
WBC NRBC COR # BLD: (no result) K/UL
WBC TOXIC VACUOLES BLD QL SMEAR: (no result)
WBC TOXIC VACUOLES BLD QL SMEAR: (no result)

## 2025-07-23 PROCEDURE — 36200 PLACE CATHETER IN AORTA: CPT

## 2025-07-23 PROCEDURE — 99152 MOD SED SAME PHYS/QHP 5/>YRS: CPT

## 2025-07-23 PROCEDURE — 80048 BASIC METABOLIC PNL TOTAL CA: CPT

## 2025-07-23 PROCEDURE — 85025 COMPLETE CBC W/AUTO DIFF WBC: CPT

## 2025-07-23 RX ADMIN — ACETAMINOPHEN STA MG: 500 TABLET ORAL at 11:20

## 2025-07-23 RX ADMIN — METHYLENE BLUE ONE MLS: 10 INJECTION INTRAVENOUS at 08:23

## 2025-07-23 RX ADMIN — ATORVASTATIN CALCIUM SCH MG: 40 TABLET, FILM COATED ORAL at 20:05

## 2025-07-23 RX ADMIN — CEFAZOLIN ONE MLS: 330 INJECTION, POWDER, FOR SOLUTION INTRAMUSCULAR; INTRAVENOUS at 08:23

## 2025-07-23 RX ADMIN — LIDOCAINE HYDROCHLORIDE ONE ML: 10 INJECTION, SOLUTION INFILTRATION; PERINEURAL at 08:43

## 2025-07-23 RX ADMIN — POTASSIUM CHLORIDE ONE MLS: 14.9 INJECTION, SOLUTION INTRAVENOUS at 07:40

## 2025-07-23 RX ADMIN — MIDAZOLAM ONE MG: 1 INJECTION INTRAMUSCULAR; INTRAVENOUS at 08:36

## 2025-07-23 RX ADMIN — CEFAZOLIN SCH: 330 INJECTION, POWDER, FOR SOLUTION INTRAMUSCULAR; INTRAVENOUS at 11:55

## 2025-07-23 RX ADMIN — IOPAMIDOL ONE ML: 755 INJECTION, SOLUTION INTRAVENOUS at 08:57

## 2025-07-23 RX ADMIN — FENTANYL CITRATE ONE MCG: 50 INJECTION, SOLUTION INTRAMUSCULAR; INTRAVENOUS at 08:36

## 2025-07-23 RX ADMIN — PROPOFOL SCH MLS/HR: 10 INJECTION, EMULSION INTRAVENOUS at 07:49

## 2025-07-24 VITALS — RESPIRATION RATE: 15 BRPM | HEART RATE: 82 BPM | TEMPERATURE: 98.2 F

## 2025-07-24 VITALS — DIASTOLIC BLOOD PRESSURE: 68 MMHG | SYSTOLIC BLOOD PRESSURE: 114 MMHG

## 2025-07-24 LAB — GLUCOSE BLD-MCNC: 173 MG/DL (ref 70–110)

## 2025-07-24 RX ADMIN — DIGOXIN SCH MCG: 125 TABLET ORAL at 09:19

## 2025-07-24 RX ADMIN — CEFAZOLIN ONE MLS/HR: 330 INJECTION, POWDER, FOR SOLUTION INTRAMUSCULAR; INTRAVENOUS at 05:51

## 2025-07-24 RX ADMIN — FENOFIBRATE SCH MG: 160 TABLET ORAL at 08:09

## 2025-07-24 RX ADMIN — INSULIN GLARGINE SCH: 100 INJECTION, SOLUTION SUBCUTANEOUS at 08:42

## 2025-07-24 RX ADMIN — Medication SCH MG: at 08:09

## 2025-07-24 RX ADMIN — ASPIRIN 81 MG CHEWABLE TABLET SCH MG: 81 TABLET CHEWABLE at 08:09

## 2025-07-24 RX ADMIN — LOSARTAN POTASSIUM SCH MG: 25 TABLET, FILM COATED ORAL at 08:09

## 2025-07-30 ENCOUNTER — HOSPITAL ENCOUNTER (OUTPATIENT)
Dept: HOSPITAL 47 - CATHCVL | Age: 70
LOS: 1 days | Discharge: HOME | End: 2025-07-31
Attending: INTERNAL MEDICINE
Payer: MEDICARE

## 2025-07-30 VITALS — BODY MASS INDEX: 30.8 KG/M2

## 2025-07-30 DIAGNOSIS — Z79.84: ICD-10-CM

## 2025-07-30 DIAGNOSIS — I70.213: ICD-10-CM

## 2025-07-30 DIAGNOSIS — Z79.4: ICD-10-CM

## 2025-07-30 DIAGNOSIS — E11.51: Primary | ICD-10-CM

## 2025-07-30 DIAGNOSIS — Z79.82: ICD-10-CM

## 2025-07-30 DIAGNOSIS — I25.10: ICD-10-CM

## 2025-07-30 DIAGNOSIS — Z79.01: ICD-10-CM

## 2025-07-30 DIAGNOSIS — Z91.199: ICD-10-CM

## 2025-07-30 DIAGNOSIS — I48.91: ICD-10-CM

## 2025-07-30 DIAGNOSIS — J44.9: ICD-10-CM

## 2025-07-30 DIAGNOSIS — Z79.85: ICD-10-CM

## 2025-07-30 LAB
ANION GAP SERPL CALC-SCNC: 11 MMOL/L
ANISOCYTOSIS BLD QL SMEAR: (no result)
ANISOCYTOSIS BLD QL SMEAR: (no result)
BASO STIPL BLD QL SMEAR: (no result)
BASO STIPL BLD QL SMEAR: (no result)
BASOPHILS # BLD AUTO: 0.04 10*3/UL (ref 0–0.1)
BASOPHILS NFR BLD AUTO: 1 %
BUN SERPL-SCNC: 41 MG/DL (ref 9–20)
BURR CELLS BLD QL SMEAR: (no result)
BURR CELLS BLD QL SMEAR: (no result)
CALCIUM SPEC-MCNC: 10.7 MG/DL (ref 8.4–10.2)
CHLORIDE SERPL-SCNC: 103 MMOL/L (ref 98–107)
CO2 SERPL-SCNC: 23 MMOL/L (ref 22–30)
CREATININE: 1.52 MG/DL (ref 0.66–1.25)
DACRYOCYTES BLD QL SMEAR: (no result)
DACRYOCYTES BLD QL SMEAR: (no result)
DOHLE BOD BLD QL SMEAR: (no result)
DOHLE BOD BLD QL SMEAR: (no result)
EOSINOPHIL # BLD AUTO: 0.14 10*3/UL (ref 0.04–0.35)
EOSINOPHIL NFR BLD AUTO: 3.4 %
ERYTHROCYTE [DISTWIDTH] IN BLOOD BY AUTOMATED COUNT: 4.33 10*6/UL (ref 4.4–5.6)
ERYTHROCYTE [DISTWIDTH] IN BLOOD: 12.7 % (ref 11.5–14.5)
GLUCOSE BLD-MCNC: 119 MG/DL (ref 70–110)
GLUCOSE BLD-MCNC: 121 MG/DL (ref 70–110)
GLUCOSE BLD-MCNC: 179 MG/DL (ref 70–110)
GLUCOSE SERPL-MCNC: 175 MG/DL (ref 74–99)
HCT VFR BLD AUTO: 41.6 % (ref 39.6–50)
HGB BLD-MCNC: 14.2 G/DL (ref 13–17)
HOWELL-JOLLY BOD BLD QL SMEAR: (no result)
HOWELL-JOLLY BOD BLD QL SMEAR: (no result)
HYPOCHROMIA BLD QL SMEAR: (no result)
HYPOCHROMIA BLD QL SMEAR: (no result)
IMM GRANULOCYTES # BLD: 0.01 10*3/UL (ref 0–0.04)
LG PLATELETS BLD QL SMEAR: (no result)
LG PLATELETS BLD QL SMEAR: (no result)
LYMPHOCYTES # SPEC AUTO: 1.02 10*3/UL (ref 0.9–5)
LYMPHOCYTES NFR SPEC AUTO: 25.1 %
Lab: (no result)
Lab: (no result)
MCH RBC QN AUTO: 32.8 PG (ref 27–32)
MCHC RBC AUTO-ENTMCNC: 34.1 G/DL (ref 32–37)
MCV RBC AUTO: 96.1 FL (ref 80–97)
MONOCYTES # BLD AUTO: 0.4 10*3/UL (ref 0.2–1)
MONOCYTES NFR BLD AUTO: 9.8 %
NEUTROPHILS # BLD AUTO: 2.46 10*3/UL (ref 1.8–7.7)
NEUTROPHILS NFR BLD AUTO: 60.5 %
NEUTS HYPERSEG # BLD: (no result) 10*3/UL
NEUTS HYPERSEG # BLD: (no result) 10*3/UL
NRBC BLD AUTO-RTO: (no result) %
NRBC BLD AUTO-RTO: (no result) %
OVALOCYTES BLD QL SMEAR: (no result)
OVALOCYTES BLD QL SMEAR: (no result)
PELGER HUET CELLS BLD QL SMEAR: (no result)
PELGER HUET CELLS BLD QL SMEAR: (no result)
PLATELET # BLD AUTO: 158 10*3/UL (ref 140–440)
PLATELETS.RETICULATED NFR BLD AUTO: (no result) %
PLATELETS.RETICULATED NFR BLD AUTO: (no result) %
PMV BLD AUTO: 10.6 FL (ref 9.5–12.2)
POIKILOCYTOSIS BLD QL SMEAR: (no result)
POLYCHROMASIA BLD QL SMEAR: (no result)
POLYCHROMASIA BLD QL SMEAR: (no result)
POTASSIUM SERPL-SCNC: 4.7 MMOL/L (ref 3.5–5.1)
RBC MORPH BLD: (no result)
RBC MORPH BLD: (no result)
ROULEAUX BLD QL SMEAR: (no result)
ROULEAUX BLD QL SMEAR: (no result)
SCHISTOCYTES BLD QL SMEAR: (no result)
SCHISTOCYTES BLD QL SMEAR: (no result)
SICKLE CELLS BLD QL SMEAR: (no result)
SICKLE CELLS BLD QL SMEAR: (no result)
SODIUM SERPL-SCNC: 137 MMOL/L (ref 137–145)
SPHEROCYTES BLD QL SMEAR: (no result)
SPHEROCYTES BLD QL SMEAR: (no result)
STOMATOCYTES BLD QL SMEAR: (no result)
STOMATOCYTES BLD QL SMEAR: (no result)
TARGETS BLD QL SMEAR: (no result)
TARGETS BLD QL SMEAR: (no result)
TOXIC GRANULES BLD QL SMEAR: (no result)
TOXIC GRANULES BLD QL SMEAR: (no result)
VARIANT LYMPHS BLD QL SMEAR: (no result)
VARIANT LYMPHS BLD QL SMEAR: (no result)
WBC # BLD AUTO: 4.07 10*3/UL (ref 4.5–10)
WBC NRBC COR # BLD: (no result) K/UL
WBC NRBC COR # BLD: (no result) K/UL
WBC TOXIC VACUOLES BLD QL SMEAR: (no result)
WBC TOXIC VACUOLES BLD QL SMEAR: (no result)

## 2025-07-30 PROCEDURE — 80048 BASIC METABOLIC PNL TOTAL CA: CPT

## 2025-07-30 PROCEDURE — 83036 HEMOGLOBIN GLYCOSYLATED A1C: CPT

## 2025-07-30 PROCEDURE — 76937 US GUIDE VASCULAR ACCESS: CPT

## 2025-07-30 PROCEDURE — 75710 ARTERY X-RAYS ARM/LEG: CPT

## 2025-07-30 PROCEDURE — 85025 COMPLETE CBC W/AUTO DIFF WBC: CPT

## 2025-07-30 PROCEDURE — 36246 INS CATH ABD/L-EXT ART 2ND: CPT

## 2025-07-30 RX ADMIN — MIDAZOLAM ONE MG: 1 INJECTION INTRAMUSCULAR; INTRAVENOUS at 14:40

## 2025-07-30 RX ADMIN — FENTANYL CITRATE ONE MCG: 50 INJECTION, SOLUTION INTRAMUSCULAR; INTRAVENOUS at 14:40

## 2025-07-30 RX ADMIN — ATORVASTATIN CALCIUM SCH MG: 80 TABLET, FILM COATED ORAL at 20:40

## 2025-07-30 RX ADMIN — CEFAZOLIN PRN MLS: 330 INJECTION, POWDER, FOR SOLUTION INTRAMUSCULAR; INTRAVENOUS at 14:54

## 2025-07-30 RX ADMIN — MORPHINE SULFATE ONE MG: 4 INJECTION, SOLUTION INTRAMUSCULAR; INTRAVENOUS at 15:10

## 2025-07-30 RX ADMIN — ASPIRIN SCH: 325 TABLET ORAL at 20:37

## 2025-07-30 RX ADMIN — FENTANYL CITRATE ONE MCG: 0.05 INJECTION, SOLUTION INTRAMUSCULAR; INTRAVENOUS at 16:10

## 2025-07-30 RX ADMIN — MIDAZOLAM ONE MG: 1 INJECTION INTRAMUSCULAR; INTRAVENOUS at 15:31

## 2025-07-30 RX ADMIN — LIDOCAINE HYDROCHLORIDE ONE ML: 10 INJECTION, SOLUTION INFILTRATION; PERINEURAL at 14:46

## 2025-07-30 RX ADMIN — FENTANYL CITRATE ONE MCG: 0.05 INJECTION, SOLUTION INTRAMUSCULAR; INTRAVENOUS at 15:39

## 2025-07-30 RX ADMIN — PROTAMINE SULFATE ONE MG: 10 INJECTION, SOLUTION INTRAVENOUS at 15:17

## 2025-07-30 RX ADMIN — HEPARIN SODIUM ONE UNIT: 1000 INJECTION, SOLUTION INTRAVENOUS; SUBCUTANEOUS at 15:56

## 2025-07-30 RX ADMIN — FENTANYL CITRATE ONE MCG: 0.05 INJECTION, SOLUTION INTRAMUSCULAR; INTRAVENOUS at 16:08

## 2025-07-30 RX ADMIN — HYDROMORPHONE HYDROCHLORIDE ONE MG: 1 INJECTION, SOLUTION INTRAMUSCULAR; INTRAVENOUS; SUBCUTANEOUS at 16:29

## 2025-07-30 RX ADMIN — HEPARIN SODIUM ONE UNIT: 1000 INJECTION, SOLUTION INTRAVENOUS; SUBCUTANEOUS at 14:53

## 2025-07-30 RX ADMIN — INSULIN LISPRO SCH: 100 INJECTION, SOLUTION INTRAVENOUS; SUBCUTANEOUS at 19:14

## 2025-07-30 RX ADMIN — METHYLENE BLUE PRN MLS: 10 INJECTION INTRAVENOUS at 14:54

## 2025-07-30 RX ADMIN — CEFAZOLIN SCH: 330 INJECTION, POWDER, FOR SOLUTION INTRAMUSCULAR; INTRAVENOUS at 20:36

## 2025-07-30 RX ADMIN — MORPHINE SULFATE ONE MG: 4 INJECTION, SOLUTION INTRAMUSCULAR; INTRAVENOUS at 15:31

## 2025-07-30 RX ADMIN — MIDAZOLAM ONE MG: 1 INJECTION INTRAMUSCULAR; INTRAVENOUS at 15:38

## 2025-07-30 RX ADMIN — HEPARIN SODIUM ONE UNIT: 1000 INJECTION, SOLUTION INTRAVENOUS; SUBCUTANEOUS at 15:30

## 2025-07-30 RX ADMIN — PROPOFOL SCH MLS/HR: 10 INJECTION, EMULSION INTRAVENOUS at 11:34

## 2025-07-30 RX ADMIN — NICARDIPINE HYDROCHLORIDE STA: 2.5 INJECTION INTRAVENOUS at 20:36

## 2025-07-30 RX ADMIN — APIXABAN SCH MG: 5 TABLET, FILM COATED ORAL at 20:40

## 2025-07-30 RX ADMIN — POTASSIUM CHLORIDE ONE MLS: 14.9 INJECTION, SOLUTION INTRAVENOUS at 11:35

## 2025-07-30 RX ADMIN — IOPAMIDOL ONE ML: 755 INJECTION, SOLUTION INTRAVENOUS at 16:34

## 2025-07-30 RX ADMIN — ACETAMINOPHEN PRN MG: 325 TABLET, FILM COATED ORAL at 17:47

## 2025-07-31 VITALS
HEART RATE: 80 BPM | RESPIRATION RATE: 17 BRPM | SYSTOLIC BLOOD PRESSURE: 143 MMHG | DIASTOLIC BLOOD PRESSURE: 79 MMHG | TEMPERATURE: 98.1 F

## 2025-07-31 LAB
ANION GAP SERPL CALC-SCNC: 9 MMOL/L
BASOPHILS # BLD AUTO: 0.04 10*3/UL (ref 0–0.1)
BASOPHILS NFR BLD AUTO: 0.6 %
BUN SERPL-SCNC: 37 MG/DL (ref 9–20)
CALCIUM SPEC-MCNC: 9.2 MG/DL (ref 8.4–10.2)
CHLORIDE SERPL-SCNC: 104 MMOL/L (ref 98–107)
CO2 SERPL-SCNC: 25 MMOL/L (ref 22–30)
CREATININE: 1.42 MG/DL (ref 0.66–1.25)
EOSINOPHIL # BLD AUTO: 0.11 10*3/UL (ref 0.04–0.35)
EOSINOPHIL NFR BLD AUTO: 1.7 %
ERYTHROCYTE [DISTWIDTH] IN BLOOD BY AUTOMATED COUNT: 4.13 10*6/UL (ref 4.4–5.6)
ERYTHROCYTE [DISTWIDTH] IN BLOOD: 12.6 % (ref 11.5–14.5)
EST. AVERAGE GLUCOSE BLD GHB EST-MCNC: 192 MG/DL
GLUCOSE BLD-MCNC: 158 MG/DL (ref 70–110)
GLUCOSE SERPL-MCNC: 151 MG/DL (ref 74–99)
HCT VFR BLD AUTO: 41 % (ref 39.6–50)
HGB BLD-MCNC: 13.4 G/DL (ref 13–17)
IMM GRANULOCYTES # BLD: 0.03 10*3/UL (ref 0–0.04)
LYMPHOCYTES # SPEC AUTO: 0.95 10*3/UL (ref 0.9–5)
LYMPHOCYTES NFR SPEC AUTO: 15.1 %
MCH RBC QN AUTO: 32.4 PG (ref 27–32)
MCHC RBC AUTO-ENTMCNC: 32.7 G/DL (ref 32–37)
MCV RBC AUTO: 99.3 FL (ref 80–97)
MONOCYTES # BLD AUTO: 0.6 10*3/UL (ref 0.2–1)
MONOCYTES NFR BLD AUTO: 9.5 %
NEUTROPHILS # BLD AUTO: 4.57 10*3/UL (ref 1.8–7.7)
NEUTROPHILS NFR BLD AUTO: 72.6 %
PLATELET # BLD AUTO: 147 10*3/UL (ref 140–440)
PMV BLD AUTO: 11 FL (ref 9.5–12.2)
POTASSIUM SERPL-SCNC: 4.7 MMOL/L (ref 3.5–5.1)
SODIUM SERPL-SCNC: 138 MMOL/L (ref 137–145)
WBC # BLD AUTO: 6.3 10*3/UL (ref 4.5–10)

## 2025-07-31 RX ADMIN — DAPAGLIFLOZIN SCH MG: 10 TABLET, FILM COATED ORAL at 08:27

## 2025-07-31 RX ADMIN — LOSARTAN POTASSIUM SCH MG: 25 TABLET, FILM COATED ORAL at 08:27

## 2025-07-31 RX ADMIN — INSULIN GLARGINE SCH UNIT: 100 INJECTION, SOLUTION SUBCUTANEOUS at 07:04

## 2025-07-31 RX ADMIN — FENOFIBRATE SCH MG: 160 TABLET ORAL at 08:27

## 2025-07-31 RX ADMIN — ASPIRIN 81 MG CHEWABLE TABLET SCH MG: 81 TABLET CHEWABLE at 08:27

## 2025-07-31 RX ADMIN — DIGOXIN SCH MCG: 125 TABLET ORAL at 08:27

## 2025-07-31 RX ADMIN — CLOPIDOGREL BISULFATE SCH MG: 75 TABLET ORAL at 08:27
